# Patient Record
Sex: FEMALE | Race: WHITE | Employment: FULL TIME | URBAN - METROPOLITAN AREA
[De-identification: names, ages, dates, MRNs, and addresses within clinical notes are randomized per-mention and may not be internally consistent; named-entity substitution may affect disease eponyms.]

---

## 2020-02-21 ENCOUNTER — OFFICE VISIT (OUTPATIENT)
Dept: PRIMARY CARE CLINIC | Age: 25
End: 2020-02-21
Payer: COMMERCIAL

## 2020-02-21 VITALS
HEART RATE: 83 BPM | SYSTOLIC BLOOD PRESSURE: 115 MMHG | OXYGEN SATURATION: 97 % | BODY MASS INDEX: 28 KG/M2 | HEIGHT: 63 IN | DIASTOLIC BLOOD PRESSURE: 70 MMHG | WEIGHT: 158 LBS

## 2020-02-21 DIAGNOSIS — Z00.00 PHYSICAL EXAM: ICD-10-CM

## 2020-02-21 LAB
A/G RATIO: 1.7 (ref 1.1–2.2)
ALBUMIN SERPL-MCNC: 4.5 G/DL (ref 3.4–5)
ALP BLD-CCNC: 40 U/L (ref 40–129)
ALT SERPL-CCNC: 40 U/L (ref 10–40)
ANION GAP SERPL CALCULATED.3IONS-SCNC: 18 MMOL/L (ref 3–16)
AST SERPL-CCNC: 57 U/L (ref 15–37)
BASOPHILS ABSOLUTE: 0 K/UL (ref 0–0.2)
BASOPHILS RELATIVE PERCENT: 0.3 %
BILIRUB SERPL-MCNC: 0.4 MG/DL (ref 0–1)
BUN BLDV-MCNC: 9 MG/DL (ref 7–20)
CALCIUM SERPL-MCNC: 9.7 MG/DL (ref 8.3–10.6)
CHLORIDE BLD-SCNC: 101 MMOL/L (ref 99–110)
CHOLESTEROL, TOTAL: 203 MG/DL (ref 0–199)
CO2: 21 MMOL/L (ref 21–32)
CREAT SERPL-MCNC: 0.6 MG/DL (ref 0.6–1.1)
EOSINOPHILS ABSOLUTE: 0.1 K/UL (ref 0–0.6)
EOSINOPHILS RELATIVE PERCENT: 1.7 %
GFR AFRICAN AMERICAN: >60
GFR NON-AFRICAN AMERICAN: >60
GLOBULIN: 2.7 G/DL
GLUCOSE BLD-MCNC: 64 MG/DL (ref 70–99)
HCT VFR BLD CALC: 40.6 % (ref 36–48)
HDLC SERPL-MCNC: 50 MG/DL (ref 40–60)
HEMOGLOBIN: 13.7 G/DL (ref 12–16)
LDL CHOLESTEROL CALCULATED: 134 MG/DL
LYMPHOCYTES ABSOLUTE: 1.3 K/UL (ref 1–5.1)
LYMPHOCYTES RELATIVE PERCENT: 17.3 %
MCH RBC QN AUTO: 30.7 PG (ref 26–34)
MCHC RBC AUTO-ENTMCNC: 33.7 G/DL (ref 31–36)
MCV RBC AUTO: 91 FL (ref 80–100)
MONOCYTES ABSOLUTE: 0.4 K/UL (ref 0–1.3)
MONOCYTES RELATIVE PERCENT: 4.8 %
NEUTROPHILS ABSOLUTE: 5.6 K/UL (ref 1.7–7.7)
NEUTROPHILS RELATIVE PERCENT: 75.9 %
PDW BLD-RTO: 12.8 % (ref 12.4–15.4)
PLATELET # BLD: 267 K/UL (ref 135–450)
PMV BLD AUTO: 7.9 FL (ref 5–10.5)
POTASSIUM SERPL-SCNC: 4.8 MMOL/L (ref 3.5–5.1)
RBC # BLD: 4.47 M/UL (ref 4–5.2)
SODIUM BLD-SCNC: 140 MMOL/L (ref 136–145)
TOTAL PROTEIN: 7.2 G/DL (ref 6.4–8.2)
TRIGL SERPL-MCNC: 94 MG/DL (ref 0–150)
VLDLC SERPL CALC-MCNC: 19 MG/DL
WBC # BLD: 7.4 K/UL (ref 4–11)

## 2020-02-21 PROCEDURE — 99385 PREV VISIT NEW AGE 18-39: CPT | Performed by: FAMILY MEDICINE

## 2020-02-21 RX ORDER — LEVONORGESTREL AND ETHINYL ESTRADIOL 0.15-0.03
KIT ORAL
COMMUNITY
Start: 2020-02-05

## 2020-02-21 ASSESSMENT — ENCOUNTER SYMPTOMS
SINUS PRESSURE: 0
BLOOD IN STOOL: 0
COUGH: 0
CHEST TIGHTNESS: 0
BACK PAIN: 0
SINUS PAIN: 0
CONSTIPATION: 0
WHEEZING: 0
ABDOMINAL PAIN: 0
SORE THROAT: 0
VOMITING: 0
DIARRHEA: 0
SHORTNESS OF BREATH: 0
NAUSEA: 0
RHINORRHEA: 0

## 2020-02-21 ASSESSMENT — PATIENT HEALTH QUESTIONNAIRE - PHQ9
SUM OF ALL RESPONSES TO PHQ QUESTIONS 1-9: 0
SUM OF ALL RESPONSES TO PHQ9 QUESTIONS 1 & 2: 0
2. FEELING DOWN, DEPRESSED OR HOPELESS: 0
SUM OF ALL RESPONSES TO PHQ QUESTIONS 1-9: 0
1. LITTLE INTEREST OR PLEASURE IN DOING THINGS: 0

## 2020-04-06 ENCOUNTER — VIRTUAL VISIT (OUTPATIENT)
Dept: PSYCHOLOGY | Age: 25
End: 2020-04-06
Payer: COMMERCIAL

## 2020-04-06 ENCOUNTER — VIRTUAL VISIT (OUTPATIENT)
Dept: PRIMARY CARE CLINIC | Age: 25
End: 2020-04-06
Payer: COMMERCIAL

## 2020-04-06 ENCOUNTER — TELEPHONE (OUTPATIENT)
Dept: PRIMARY CARE CLINIC | Age: 25
End: 2020-04-06

## 2020-04-06 PROCEDURE — 90791 PSYCH DIAGNOSTIC EVALUATION: CPT | Performed by: PSYCHOLOGIST

## 2020-04-06 PROCEDURE — 99212 OFFICE O/P EST SF 10 MIN: CPT | Performed by: FAMILY MEDICINE

## 2020-04-06 ASSESSMENT — PATIENT HEALTH QUESTIONNAIRE - PHQ9
SUM OF ALL RESPONSES TO PHQ QUESTIONS 1-9: 0
SUM OF ALL RESPONSES TO PHQ9 QUESTIONS 1 & 2: 0
1. LITTLE INTEREST OR PLEASURE IN DOING THINGS: 0
SUM OF ALL RESPONSES TO PHQ QUESTIONS 1-9: 0
2. FEELING DOWN, DEPRESSED OR HOPELESS: 0

## 2020-04-06 ASSESSMENT — ANXIETY QUESTIONNAIRES
6. BECOMING EASILY ANNOYED OR IRRITABLE: 0-NOT AT ALL
1. FEELING NERVOUS, ANXIOUS, OR ON EDGE: 0-NOT AT ALL
3. WORRYING TOO MUCH ABOUT DIFFERENT THINGS: 0-NOT AT ALL
7. FEELING AFRAID AS IF SOMETHING AWFUL MIGHT HAPPEN: 0-NOT AT ALL
2. NOT BEING ABLE TO STOP OR CONTROL WORRYING: 0-NOT AT ALL
GAD7 TOTAL SCORE: 1
5. BEING SO RESTLESS THAT IT IS HARD TO SIT STILL: 1-SEVERAL DAYS
4. TROUBLE RELAXING: 0-NOT AT ALL

## 2020-04-06 NOTE — Clinical Note
Jessica, Let me know what you think about eval (see Assess section). I wasn't sure if you would want VV with pt to discuss or if you felt comfortable prescribing without. I let pt know you would need to decide this.   Crystal

## 2020-04-06 NOTE — PROGRESS NOTES
Behavioral Health Consultation  Gricel Tello PsyD  Psychologist  4/6/2020  9:08 AM      PLEASE NOTE THIS SHOULD HAVE BEEN DOCUMENTED AS TELEMEDICINE VISIT. NOTE - this visit conducted via audiovisual means : MyChart, Doxy, etc, in accordance with CMS guidelines. During AUFKE-50 public health emergency. Visit initiated at patient or caregiver request with permission to bill to insurer granted. Telemedicine APA guidelines were reviewed and discussed. Patient gave verbal consent for teleservices and will sign a consent form when feasible. Location of provider: [facility] care  Location of patient: home    Time spent with Patient: 35 minutes  This is patient's first  [facility] appointment. Reason for Consult:  ADD, predominate inattentive type  Referring Provider: Samantha Calvo MD  [address] [place] [address] [address] [address] [facility] Atilio López, Kendra [facility] 70    Pt provided informed consent for the behavioral health program. Discussed with patient model of service to include the limits of confidentiality (i.e. abuse reporting, suicide intervention, etc.) and short-term intervention focused approach. Pt indicated understanding. Feedback given to PCP. S:    Presenting Problem (PP): ADHD? Problem hx: Per Dr Juanito Graves note on 2/21: \"Would like a formal evaluation of ADHD. She does not want to establish with psychiatry just yet. Per patient she is having difficulty focusing and concentrating. \"    UPDATE: full time at work. Attention problems at work, biggest symptom is trouble listening with others, tend to drift off when others talk. If task not stimulating to her, she will \"push it off\". Most of her feedback is that \"friends tell me that I'm not a good listener\". Tends to fidget but doesn't need to move around physically. Work: feels attention is impacting performance at work, will miss pieces of instruction, have to ask 3-4 x before \"get it\".  Good news:  Not at risk for losing job    School hx: figured a way around it as a child and HS. Until college, struggled more, B average student. Major: engineering. Work in . College: [employer]. Graduate school at [address] as well: [employer] for engineers. Work: [employer], [address] [facility] for the last 3 years    Moved here to [place] 3 years ago. Home town: [place]    Stressor: no family stressors, no dating relationship issues    Dating relationship: none    Social support: a few friends been visiting     Trauma hx: none    Past psych tx: none    Current psych med tx: none     Psych ROS:      Depression: negative screen     Airam: DENIES insomnia with increased energy, rapid speech, easily distracted or decreased attention, irritability, racing thoughts, expansive mood, increase in energy and goal directed behavior, grandiosity, flight of ideas     Anxiety:  negative screen    OCD:  Denies     Panic:  Denies     Psychosis: denies A/VH, or delusions    Substance abuse: None     PTSD:  negative screen    O:  MSE:    Appearance    alert, cooperative  Appetite normal  Sleep disturbance No  Fatigue No  Loss of pleasure No  Impulsive behavior No  Speech    normal rate, normal volume and well articulated  Mood    Stable, managing  Affect    normal affect  Thought Content    intact  Thought Process    linear, goal directed and coherent  Associations    logical connections  Insight    Good  Judgment    Intact  Orientation    oriented to person, place, time, and general circumstances  Memory    recent and remote memory intact  Attention/Concentration    intact  Morbid ideation No  Suicide Assessment    no suicidal ideation      History:    Medications:   Current Outpatient Medications   Medication Sig Dispense Refill    LILLOW 0.15-30 MG-MCG per tablet        No current facility-administered medications for this visit.         Social History:   Social History     Socioeconomic History    Marital status: Single     Spouse name: Dad-Dylan Greer Severity: 1-4 = Minimal depression, 5-9 = Mild depression, 10-14 = Moderate depression, 15-19 = Moderately severe depression, 20-27 = Severe depression    NICKY 7 SCORE 4/6/2020   NICKY-7 Total Score 1     Interpretation of NICKY-7 score: 5-9 = mild anxiety, 10-14 = moderate anxiety, 15+ = severe anxiety. Recommend referral to behavioral health for scores 10 or greater. No hx psych admission. No hx suicide attempts/gestures. No hx substance abuse tx. Family MH hx: none. Denied any si/hi risk. Diagnosis:    ADD, predominate inattentive type  No past medical history on file. Problems related to the social environment and Occupational problems    Plan:  Pt interventions:    Provided education on the use of medication to treat  attention issues. , Practiced assertive communication, Discussed self-care (sleep, nutrition, rewarding activities, social support, exercise), Established rapport, Conducted functional assessment and Supportive techniques    Pt Behavioral Change Plan:    1. Read ADHD handout: discussed benefits of physical exercise and impact on ADD brain  2. Consult with Dr Jose Benavidez about ADD medication trial options  3.  No further follow up required with Dr Cassandra Pereira, return as needed

## 2020-04-07 ASSESSMENT — ENCOUNTER SYMPTOMS
COUGH: 0
DIARRHEA: 0
ABDOMINAL PAIN: 0
SHORTNESS OF BREATH: 0
CONSTIPATION: 0
EYE PAIN: 0

## 2020-05-05 ENCOUNTER — VIRTUAL VISIT (OUTPATIENT)
Dept: PRIMARY CARE CLINIC | Age: 25
End: 2020-05-05
Payer: COMMERCIAL

## 2020-05-05 PROCEDURE — 99212 OFFICE O/P EST SF 10 MIN: CPT | Performed by: FAMILY MEDICINE

## 2020-05-05 ASSESSMENT — ENCOUNTER SYMPTOMS
DIARRHEA: 0
CONSTIPATION: 0
EYE PAIN: 0
COUGH: 0
SHORTNESS OF BREATH: 0
ABDOMINAL PAIN: 0

## 2020-05-05 NOTE — PROGRESS NOTES
Socially    Drug use: Never            PHYSICAL EXAMINATION:  [ INSTRUCTIONS:  \"[x]\" Indicates a positive item  \"[]\" Indicates a negative item  -- DELETE ALL ITEMS NOT EXAMINED]  [x] Alert  [] Oriented to person/place/time    [x] No apparent distress  [] Toxic appearing    [] Face flushed appearing [x] Sclera clear  [] Lips are cyanotic      [x] Breathing appears normal  [] Appears tachypneic      [] Rash on visible skin    [x] Cranial Nerves II-XII grossly intact    [x] Motor grossly intact in visible upper extremities    [x] Motor grossly intact in visible lower extremities    [x] Normal Mood  [] Anxious appearing    [] Depressed appearing  [] Confused appearing        Due to this being a TeleHealth encounter, evaluation of the following organ systems is limited: Vitals/Constitutional/EENT/Resp/CV/GI//MS/Neuro/Skin/Heme-Lymph-Imm. ASSESSMENT/PLAN:  1. ADHD (attention deficit hyperactivity disorder), inattentive type  Would like to try 30 mg daily to see how she feels. Will refill 10 mg tablets, taking three tabs daily. Will let me know if she wants to go back down to 20 or stay at 30 mg. Follow up in 3 months, sooner if needed. - Lisdexamfetamine Dimesylate 10 MG CAPS; Take 30 mg by mouth daily for 30 days. Dispense: 90 capsule; Refill: 0      No follow-ups on file. No future appointments. An  electronic signature was used to authenticate this note. --Roderick Ramos MD on 5/5/2020 at 1:39 PM    {Coding Help -- Use CPT 28723-16875 with EM elements or Time rules for Office Visits. :    5 to 10 minutes were spent on the digital evaluation and management of this patient.     Pursuant to the emergency declaration under the [facility], [facility] waiver authority and the [employer] Act, this Virtual  Visit was conducted, with patient's consent, to reduce the patient's risk of exposure to COVID-19 and provide continuity of care for an established patient. Services were provided through a video synchronous discussion virtually to substitute for in-person clinic visit.

## 2020-06-09 ENCOUNTER — PATIENT MESSAGE (OUTPATIENT)
Dept: PRIMARY CARE CLINIC | Age: 25
End: 2020-06-09

## 2020-06-27 ENCOUNTER — OFFICE VISIT (OUTPATIENT)
Dept: PRIMARY CARE CLINIC | Age: 25
End: 2020-06-27

## 2020-06-27 NOTE — PROGRESS NOTES
Marlee Rasmussen received a viral test for COVID-19. They were educated on isolation and quarantine as appropriate. For any symptoms, they were directed to seek care from their PCP, given contact information to establish with a doctor, directed to an urgent care or the emergency room.

## 2020-06-30 LAB
SARS-COV-2: NOT DETECTED
SOURCE: NORMAL

## 2020-07-10 ENCOUNTER — PATIENT MESSAGE (OUTPATIENT)
Dept: PRIMARY CARE CLINIC | Age: 25
End: 2020-07-10

## 2020-07-10 NOTE — TELEPHONE ENCOUNTER
From: Lan Reyes  To: Jose Pereira MD  Sent: 7/10/2020 11:53 AM EDT  Subject: Prescription Question    Hi Dr. Adalid Dumont,    I am low again on my 30mg Vyvanse. Let me know if a follow up call is required. Thanks!   Miah Covington

## 2020-07-10 NOTE — TELEPHONE ENCOUNTER
[address]  LOV 05/05/20  Return in about 3 months   No F/U       PDMP  Last reviewed by Nathaniel White MD on 6/9/2020 at 2:01 PM

## 2020-08-11 ENCOUNTER — VIRTUAL VISIT (OUTPATIENT)
Dept: PRIMARY CARE CLINIC | Age: 25
End: 2020-08-11
Payer: COMMERCIAL

## 2020-08-11 PROCEDURE — 99213 OFFICE O/P EST LOW 20 MIN: CPT | Performed by: STUDENT IN AN ORGANIZED HEALTH CARE EDUCATION/TRAINING PROGRAM

## 2020-08-11 ASSESSMENT — ENCOUNTER SYMPTOMS
COUGH: 0
SHORTNESS OF BREATH: 0
DIARRHEA: 0
CONSTIPATION: 0
EYE PAIN: 0
ABDOMINAL PAIN: 0
BLOOD IN STOOL: 0

## 2020-08-11 NOTE — PATIENT INSTRUCTIONS
exposed skin. · See a dentist one or two times a year for checkups and to have your teeth cleaned. · Wear a seat belt in the car. Follow your doctor's advice about when to have certain tests. These tests can spot problems early. For everyone  · Cholesterol. Have the fat (cholesterol) in your blood tested after age 21. Your doctor will tell you how often to have this done based on your age, family history, or other things that can increase your risk for heart disease. · Blood pressure. Have your blood pressure checked during a routine doctor visit. Your doctor will tell you how often to check your blood pressure based on your age, your blood pressure results, and other factors. · Vision. Talk with your doctor about how often to have a glaucoma test.  · Diabetes. Ask your doctor whether you should have tests for diabetes. · Colon cancer. Your risk for colorectal cancer gets higher as you get older. Some experts say that adults should start regular screening at age 48 and stop at age 76. Others say to start before age 48 or continue after age 76. Talk with your doctor about your risk and when to start and stop screening. For women  · Breast exam and mammogram. Talk to your doctor about when you should a mammogram. Medical experts differ on whether and how often women under 50 should have these tests. Your doctor can help you decide what is right for you. · Cervical cancer screening test and pelvic exam. Begin with a Pap test at age 24. The test often is part of a pelvic exam. Starting at age 27, you may choose to have a Pap test, an HPV test, or both tests at the same time (called co-testing). Talk with your doctor about how often to have testing. · Tests for sexually transmitted infections (STIs). Ask whether you should have tests for STIs. You may be at risk if you have sex with more than one person, especially if your partners do not wear condoms.   For men  · Tests for sexually transmitted infections (STIs). Ask whether you should have tests for STIs. You may be at risk if you have sex with more than one person, especially if you do not wear a condom. · Testicular cancer exam. Ask your doctor whether you should check your testicles regularly. · Prostate exam. Talk to your doctor about whether you should have a blood test (called a PSA test) for prostate cancer. Experts differ on whether and when men should have this test. Some experts suggest it if you are older than 39 and are -American or have a father or brother who got prostate cancer when he was younger than 72. When should you call for help? Watch closely for changes in your health, and be sure to contact your doctor if you have any problems or symptoms that concern you. Where can you learn more? Go to [email or web address] Biosci[email or web address] [email or web address] org and sign in to your [employer] account. Enter P072 in the Exo box to learn more about \"Well Visit, Ages 25 to 48: Care Instructions. \"     If you do not have an account, please click on the \"Sign Up Now\" link. Current as of: August 22, 2019               Content Version: 12.5  © 8850-1919 Healthwise, [employer]. Care instructions adapted under license by [facility] ([facility]). If you have questions about a medical condition or this instruction, always ask your healthcare professional. [place] [address] any warranty or liability for your use of this information. Patient Education        Attention Deficit Hyperactivity Disorder (ADHD) in Adults: Care Instructions  Your Care Instructions     Attention deficit hyperactivity disorder, or ADHD, is a condition that makes it hard to pay attention. So you may have problems when you try to focus, get organized, and finish tasks. It might make you more active than other people. Or you might do things without thinking first.  ADHD is very common. It usually starts in early childhood.  Many adults don't realize they have it until their children are diagnosed. Then they become aware of their own symptoms. Doctors don't know what causes ADHD. But it often runs in families. ADHD can be treated with medicines, behavior training, and counseling. Treatment can improve your life. Follow-up care is a key part of your treatment and safety. Be sure to make and go to all appointments, and call your doctor if you are having problems. It's also a good idea to know your test results and keep a list of the medicines you take. How can you care for yourself at home? · Learn all you can about ADHD. This will help you and your family understand it better. · Take your medicines exactly as prescribed. Call your doctor if you think you are having a problem with your medicine. You will get more details on the specific medicines your doctor prescribes. · If you miss a dose of your medicine, do not take an extra dose. · If your doctor suggests counseling, find a counselor you like and trust. Talk openly and honestly. Be willing to make some changes. · Find a support group for adults with ADHD. Talking to others with the same problems can help you feel better. It can also give you ideas about how to best cope with the condition. · Get rid of distractions at your work space. Keep your desk clean. Try not to face a window or busy hallway. · Use files, planners, and other tools to keep you organized. · Limit use of alcohol, and do not use illegal drugs. People with ADHD tend to develop substance use disorder more easily than others. Tell your doctor if you need help to quit. Counseling, support groups, and sometimes medicines can help you stay free of alcohol or drugs. · Get at least 30 minutes of physical activity on most days of the week. Exercise has been shown to help people cope with ADHD. Walking is a good choice. You also may want to do other activities, such as running, swimming, cycling, or playing tennis or team sports.   When should you call for help?  Watch closely for changes in your health, and be sure to contact your doctor if:  · You feel sad a lot or cry all the time. · You have trouble sleeping, or you sleep too much. · You find it hard to concentrate, make decisions, or remember things. · You change how you normally eat. · You feel guilty for no reason. Where can you learn more? Go to https://SplitpeeShop Ventures.Isoflux. org and sign in to your [employer] account. Enter B196 in the Snaptalent box to learn more about \"Attention Deficit Hyperactivity Disorder (ADHD) in Adults: Care Instructions. \"     If you do not have an account, please click on the \"Sign Up Now\" link. Current as of: January 31, 2020               Content Version: 12.5  © 9314-5646 Healthwise, Incorporated. Care instructions adapted under license by [facility] ([facility]). If you have questions about a medical condition or this instruction, always ask your healthcare professional. [address] any warranty or liability for your use of this information.

## 2020-08-11 NOTE — PROGRESS NOTES
2020    Vineet Chanel (:  1995) is a 30 y.o. female, here for evaluation of the following medical concerns:    HPI    Sexual activity: {Persons; sexual partners:705}   Last eye exam: ***, {NORMAL/ABNORMAL:292608688::\"normal\"}  Last Dental exam: ***, {NORMAL/ABNORMAL:799698163::\"normal\"}  Diet: {BZUU:25324}  Exercise: {EXERCISE RRZI:168638945}  Seatbelt use: {YES/NO:}  Helmet use: {YES/NO:}  Sunscreen: {YES/NO:}    Review of Systems   Constitutional: Negative for activity change, fatigue and unexpected weight change. HENT: Negative for hearing loss. Eyes: Negative for visual disturbance. Respiratory: Negative for cough and shortness of breath. Cardiovascular: Negative for chest pain and leg swelling. Gastrointestinal: Negative for blood in stool. Endocrine: Negative for polydipsia and polyphagia. Genitourinary: Negative for dysuria and frequency. Musculoskeletal: Negative for arthralgias. Skin: Negative for rash. Allergic/Immunologic: Negative for environmental allergies. Neurological: Negative for dizziness and headaches. Hematological: Does not bruise/bleed easily. Psychiatric/Behavioral: Negative for dysphoric mood and sleep disturbance. The patient is not nervous/anxious. Prior to Visit Medications    Medication Sig Taking? Authorizing Provider   Lisdexamfetamine Dimesylate 10 MG CAPS Take 30 mg by mouth daily for 30 days. MD JOANNE Yanez 0.15-30 MG-MCG per tablet   Historical Provider, MD        Allergies   Allergen Reactions    Amoxicillin Hives     Unknown patient states she has been tested for that when she was younger         No past medical history on file. No past surgical history on file.     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[employer]   Social Needs    Financial resource strain: Not on file    Food insecurity     Worry: Not on file     Inability: Not on file   [employer] needs     Medical: Not on file     Non-medical: Not on file   Tobacco Use    Smoking status: Never Smoker    Smokeless tobacco: Never Used   Substance and Sexual Activity    Alcohol use: Yes     Comment: Socially    Drug use: Never    Sexual activity: Not Currently     Partners: Male     Birth control/protection: Pill   Lifestyle    Physical activity     Days per week: Not on file     Minutes per session: Not on file    Stress: Not on file   Relationships    Social connections     Talks on phone: Not on file     Gets together: Not on file     Attends Synagogue service: Not on file     Active member of club or organization: Not on file     Attends meetings of clubs or organizations: Not on file     Relationship status: Not on file    Intimate partner violence     Fear of current or ex partner: Not on file     Emotionally abused: Not on file     Physically abused: Not on file     Forced sexual activity: Not on file   Other Topics Concern    Not on file   Social History Narrative    Not on file        Family History   Problem Relation Age of Onset    Hypertension Mother     No Known Problems Father     No Known Problems Brother     No Known Problems Brother        There were no vitals filed for this visit. Estimated body mass index is 28.21 kg/m² as calculated from the following:    Height as of 2/21/20: 5' 2.75\" (1.594 m). Weight as of 2/21/20: 158 lb (71.7 kg). Physical Exam  Constitutional:       Appearance: Normal appearance. HENT:      Head: Normocephalic and atraumatic. Nose: Nose normal.   Eyes:      Extraocular Movements: Extraocular movements intact. Conjunctiva/sclera: Conjunctivae normal.   Neck:      Musculoskeletal: Normal range of motion and neck supple.    Cardiovascular:      Rate and Rhythm: Normal rate and regular rhythm. Pulses: Normal pulses. Pulmonary:      Effort: Pulmonary effort is normal.      Breath sounds: Normal breath sounds. Abdominal:      General: Abdomen is flat. Bowel sounds are normal.      Palpations: Abdomen is soft. Musculoskeletal: Normal range of motion. Skin:     General: Skin is warm and dry. Capillary Refill: Capillary refill takes less than 2 seconds. Findings: No rash. Neurological:      General: No focal deficit present. Mental Status: She is alert and oriented to person, place, and time. Mental status is at baseline. Psychiatric:         Mood and Affect: Mood normal.         Separate Identifiable issues addressed today:  {Visit Chronic CHP (Optional):01785}    ASSESSMENT/PLAN:  Diagnoses and all orders for this visit:    Encounter for medical examination to establish care        No follow-ups on file. An  electronic signature was used to authenticate this note.     --Chase Daley, DO on 8/11/2020 at 7:17 AM

## 2020-08-11 NOTE — PROGRESS NOTES
2020    TELEHEALTH EVALUATION -- Audio/Visual (During QZLWR-73 public health emergency)    HPI:    Alissa Lockett (:  1995) has requested an audio/video evaluation for the following concern(s):    ADD/ADHD:  Current treatment: Vyvanse- 30 mg, which has been effective. Residual symptoms: none. Medication side effects: None. Patient denies weight loss, nervousness, anxiousness, decreased appetite. Review of Systems   Constitutional: Negative for appetite change, chills, fatigue and fever. HENT: Negative for congestion. Eyes: Negative for pain and visual disturbance. Respiratory: Negative for cough and shortness of breath. Cardiovascular: Negative for chest pain and palpitations. Gastrointestinal: Negative for abdominal pain, constipation and diarrhea. Genitourinary: Negative for difficulty urinating. Musculoskeletal: Negative for arthralgias. Skin: Negative for rash and wound. Neurological: Negative for dizziness, weakness, light-headedness and headaches. Hematological: Does not bruise/bleed easily. Psychiatric/Behavioral: Negative for behavioral problems. Prior to Visit Medications    Medication Sig Taking? Authorizing Provider   Lisdexamfetamine Dimesylate 10 MG CAPS Take 30 mg by mouth daily for 30 days. Yes Shruti Lynne DO   LILLOW 0.15-30 MG-MCG per tablet   Historical Provider, MD       Social History     Tobacco Use    Smoking status: Never Smoker    Smokeless tobacco: Never Used   Substance Use Topics    Alcohol use: Yes     Comment: Socially    Drug use: Never        Family History   Problem Relation Age of Onset    Hypertension Mother     No Known Problems Father     No Known Problems Brother     No Known Problems Brother      Physical Exam  Constitutional:       Appearance: Normal appearance. HENT:      Head: Normocephalic and atraumatic. Nose: Nose normal.   Eyes:      Extraocular Movements: Extraocular movements intact.       Conjunctiva/sclera: Conjunctivae normal.   Pulmonary:      Effort: Pulmonary effort is normal.   Neurological:      Mental Status: She is alert. Psychiatric:         Mood and Affect: Mood normal.       ASSESSMENT/PLAN:  1. ADHD (attention deficit hyperactivity disorder), inattentive type: Doing well with focus. No SEs. Routine follow up in 3 month. - Lisdexamfetamine Dimesylate 10 MG CAPS; Take 30 mg by mouth daily for 30 days. Dispense: 90 capsule; Refill: 0      Return in about 3 months (around 11/11/2020). Rosy Molina is a 30 y.o. female being evaluated by a Virtual Visit (video visit) encounter to address concerns as mentioned above. A caregiver was present when appropriate. Due to this being a TeleHealth encounter (During [facility]-41 public health emergency), evaluation of the following organ systems was limited: Vitals/Constitutional/EENT/Resp/CV/GI//MS/Neuro/Skin/Heme-Lymph-Imm. Pursuant to the emergency declaration under the [address] [place] [address], [address] [place] [address] and the [employer] and [employer] Act, this Virtual Visit was conducted with patient's (and/or legal guardian's) consent, to reduce the patient's risk of exposure to COVID-19 and provide necessary medical care. The patient (and/or legal guardian) has also been advised to contact this office for worsening conditions or problems, and seek emergency medical treatment and/or call 911 if deemed necessary. Patient identification was verified at the start of the visit: Yes    Total time spent on this encounter: ~ 10 min    Services were provided through a video synchronous discussion virtually to substitute for in-person clinic visit. Patient and provider were located at their individual homes. --Rene Disla DO on 8/11/2020 at 12:02 PM    An electronic signature was used to authenticate this note.

## 2020-09-10 ENCOUNTER — PATIENT MESSAGE (OUTPATIENT)
Dept: PRIMARY CARE CLINIC | Age: 25
End: 2020-09-10

## 2020-09-10 NOTE — TELEPHONE ENCOUNTER
Tera NEW 08/11/20   Return in about 3 months   No F/U       PDMP  Last reviewed by Nicole Reagan DO on 8/11/2020 at 12:04 PM

## 2020-10-12 ENCOUNTER — PATIENT MESSAGE (OUTPATIENT)
Dept: PRIMARY CARE CLINIC | Age: 25
End: 2020-10-12

## 2020-10-13 ENCOUNTER — TELEPHONE (OUTPATIENT)
Dept: PRIMARY CARE CLINIC | Age: 25
End: 2020-10-13

## 2020-10-13 NOTE — TELEPHONE ENCOUNTER
Received fax from pharmacy stating Vyvanse 10mg is not covered under insurance. Called pharm and they informed me that It exceeds her limit for a 30 day supply. This would need a PA or can change the script to 30 mg 1X daily. What would you like to do?

## 2020-12-09 ENCOUNTER — PATIENT MESSAGE (OUTPATIENT)
Dept: PRIMARY CARE CLINIC | Age: 25
End: 2020-12-09

## 2020-12-09 NOTE — TELEPHONE ENCOUNTER
From: Jensen Guerra  To: Angelo Gaviria MD  Sent: 12/9/2020 12:14 PM EST  Subject: Prescription Question    Hi Dr. Alysa Calixto,    I have a few days left on my Vyvanse.

## 2021-01-11 ENCOUNTER — PATIENT MESSAGE (OUTPATIENT)
Dept: PRIMARY CARE CLINIC | Age: 26
End: 2021-01-11

## 2021-01-11 DIAGNOSIS — F90.0 ADHD (ATTENTION DEFICIT HYPERACTIVITY DISORDER), INATTENTIVE TYPE: ICD-10-CM

## 2021-01-11 NOTE — TELEPHONE ENCOUNTER
From: Autumn Gillespie  To: Rama Florentino MD  Sent: 1/11/2021 9:25 AM EST  Subject: Prescription Question    Hi Dr. Jonathan Hernadez,    I have a few days left on my Vyvanse. Let me know if we need to meet.     Thanks,  Barbra Mcmahan

## 2021-01-13 ENCOUNTER — TELEPHONE (OUTPATIENT)
Dept: PRIMARY CARE CLINIC | Age: 26
End: 2021-01-13

## 2021-01-13 DIAGNOSIS — F90.0 ADHD (ATTENTION DEFICIT HYPERACTIVITY DISORDER), INATTENTIVE TYPE: ICD-10-CM

## 2021-01-13 NOTE — TELEPHONE ENCOUNTER
Pt requesting lisdexamfetamine (VYVANSE) 30 MG capsule [[id number]] be sent to new pharmacy. Old pharmacy is switching systems so they are behind. New pharmacy in chart.

## 2021-02-10 ENCOUNTER — PATIENT MESSAGE (OUTPATIENT)
Dept: PRIMARY CARE CLINIC | Age: 26
End: 2021-02-10

## 2021-02-10 DIAGNOSIS — F90.0 ADHD (ATTENTION DEFICIT HYPERACTIVITY DISORDER), INATTENTIVE TYPE: ICD-10-CM

## 2021-02-11 NOTE — TELEPHONE ENCOUNTER
From: Priya Walker  To: Linus Diaz MD  Sent: 2/10/2021 1:27 PM EST  Subject: Adams Henry,    I have a few days left on my Vyvanse.     Thanks,  Alexy Lama

## 2021-02-13 NOTE — PROGRESS NOTES
2/15/2021    TELEHEALTH EVALUATION -- Audio/Visual (During WUAOX-01 public health emergency)    HPI:    Shobha Reagan (:  1995) has requested an audio/video evaluation for the following concern(s):    Current Meds:  Vyvanse 30 mg daily   -Takes 7 days/week  Sleeping: No problems reported  Eating: No reported changes in appetite. Weight Loss/Gain: No concerns reported. Side effects to the medication: Denied CP, Palpitations, SOB, Changes in bowel habits. Work has been going great. Are there any new stressors affecting Karen?: No, she is feeling that Vyvanse 30 is not making the cut most days. It is great in the day time but after 2pm or so, it wears off, especially after lunch, and she still needs 3-4 more hours of activity/stim. If there is counseling or mental health involvement, is Miladys Prince attending sessions? No    Review of Systems   Constitutional: Negative for appetite change, chills, fatigue and fever. HENT: Negative for congestion. Eyes: Negative for pain and visual disturbance. Respiratory: Negative for cough and shortness of breath. Cardiovascular: Negative for chest pain and palpitations. Gastrointestinal: Negative for abdominal pain, constipation and diarrhea. Genitourinary: Negative for difficulty urinating. Musculoskeletal: Negative for arthralgias. Skin: Negative for rash and wound. Neurological: Negative for dizziness, weakness, light-headedness and headaches. Hematological: Does not bruise/bleed easily. Psychiatric/Behavioral: Negative for behavioral problems. Prior to Visit Medications    Medication Sig Taking? Authorizing Provider   amphetamine-dextroamphetamine (ADDERALL, 5MG,) 5 MG tablet Take 1 tablet by mouth daily as needed (decreased focus/concentration) for up to 30 days. Yes Claudio Farias MD   lisdexamfetamine (VYVANSE) 30 MG capsule Take 1 capsule by mouth every morning for 30 days.  Yes Claudio Farias MD [facility] 0.15-30 MG-MCG per tablet   Historical Provider, MD       Social History     Tobacco Use    Smoking status: Never Smoker    Smokeless tobacco: Never Used   Substance Use Topics    Alcohol use: Yes     Comment: Socially    Drug use: Never        Allergies   Allergen Reactions    Amoxicillin Hives     Unknown patient states she has been tested for that when she was younger     , History reviewed. No pertinent past medical history. , History reviewed. No pertinent surgical history. ,   Social History     Tobacco Use    Smoking status: Never Smoker    Smokeless tobacco: Never Used   Substance Use Topics    Alcohol use: Yes     Comment: Socially    Drug use: Never   ,   Family History   Problem Relation Age of Onset    Hypertension Mother     No Known Problems Father     No Known Problems Brother     No Known Problems Brother    ,   Immunization History   Administered Date(s) Administered    DTP 1993, 10/28/1998    DTP/HiB 1993, 02/04/1994    DTaP (Infanrix) 04/24/1995    HPV Quadrivalent (Gardasil) 01/04/2006, 10/31/2006, 08/22/2007    Hepatitis A Ped/Adol (Havrix, Vaqta) 08/22/2007, 06/12/2009    Hepatitis B 1993, 02/04/1994, 07/08/1994    Hib vaccine 1993, 02/04/1994, 04/08/1994, 10/17/1994    MMR 02/13/1995, 10/28/1998    Meningococcal MCV4P (Menactra) 08/22/2007, 08/09/2012    Polio OPV 1993, 02/04/1994, 04/24/1995, 10/28/1998    Tdap (Boostrix, Adacel) 10/31/2006    Varicella (Varivax) 11/30/1995, 10/31/2006   ,   Health Maintenance   Topic Date Due    Hepatitis C screen  1995    HIV screen  09/27/2008    Cervical cancer screen  09/27/2014    DTaP/Tdap/Td vaccine (6 - Td) 10/31/2016    Flu vaccine (1) 09/01/2020    Hepatitis A vaccine  Completed    Hepatitis B vaccine  Completed    Hib vaccine  Completed    Varicella vaccine  Completed    Meningococcal (ACWY) vaccine  Completed    Pneumococcal 0-64 years Vaccine  Aged Out PHYSICAL EXAMINATION:  [ INSTRUCTIONS:  \"[x]\" Indicates a positive item  \"[]\" Indicates a negative item  -- DELETE ALL ITEMS NOT EXAMINED]  [x] Alert  [x] Oriented to person/place/time    [x] No apparent distress  [] Toxic appearing    [] Face flushed appearing [x] Sclera clear  [] Lips are cyanotic      [x] Breathing appears normal  [] Appears tachypneic      [] Rash on visible skin    [x] Cranial Nerves II-XII grossly intact    [] Motor grossly intact in visible upper extremities    [] Motor grossly intact in visible lower extremities    [x] Normal Mood  [] Anxious appearing    [] Depressed appearing  [] Confused appearing      Due to this being a TeleHealth encounter, evaluation of the following organ systems is limited: Vitals/Constitutional/EENT/Resp/CV/GI//MS/Neuro/Skin/Heme-Lymph-Imm. ASSESSMENT/PLAN:  1. ADHD (attention deficit hyperactivity disorder), inattentive type  Will continue Vyvanse 30 mg daily. On days that she needs more stim for on days she works longer, will incorp a Adderall 5 mg daily PRN. If this does not work, will try Vyvanse 40 mg. Follow up in 2 weeks via [facility] and 3 months for controlled sub/ADHD f/u  - amphetamine-dextroamphetamine (ADDERALL, 5MG,) 5 MG tablet; Take 1 tablet by mouth daily as needed (decreased focus/concentration) for up to 30 days. Dispense: 30 tablet; Refill: 0  - lisdexamfetamine (VYVANSE) 30 MG capsule; Take 1 capsule by mouth every morning for 30 days. Dispense: 30 capsule; Refill: 0      No follow-ups on file. No future appointments. An  electronic signature was used to authenticate this note. --Emilie Howell MD on 2/15/2021 at 10:37 AM    {Coding Help -- Use CPT 38098-63175 with EM elements or Time rules for Office Visits. :    11 to 15 minutes were spent on the digital evaluation and management of this patient. Pursuant to the emergency declaration under the [facility] waiver authority and the [employer] Act, this Virtual  Visit was conducted, with patient's consent, to reduce the patient's risk of exposure to COVID-19 and provide continuity of care for an established patient. Services were provided through a video synchronous discussion virtually to substitute for in-person clinic visit.

## 2021-02-15 ENCOUNTER — VIRTUAL VISIT (OUTPATIENT)
Dept: PRIMARY CARE CLINIC | Age: 26
End: 2021-02-15
Payer: COMMERCIAL

## 2021-02-15 DIAGNOSIS — F90.0 ADHD (ATTENTION DEFICIT HYPERACTIVITY DISORDER), INATTENTIVE TYPE: Primary | ICD-10-CM

## 2021-02-15 PROCEDURE — 99214 OFFICE O/P EST MOD 30 MIN: CPT | Performed by: FAMILY MEDICINE

## 2021-02-15 RX ORDER — DEXTROAMPHETAMINE SACCHARATE, AMPHETAMINE ASPARTATE, DEXTROAMPHETAMINE SULFATE AND AMPHETAMINE SULFATE 1.25; 1.25; 1.25; 1.25 MG/1; MG/1; MG/1; MG/1
5 TABLET ORAL DAILY PRN
Qty: 30 TABLET | Refills: 0 | Status: SHIPPED | OUTPATIENT
Start: 2021-02-15 | End: 2021-03-11 | Stop reason: SDUPTHER

## 2021-02-15 ASSESSMENT — ENCOUNTER SYMPTOMS
ABDOMINAL PAIN: 0
COUGH: 0
DIARRHEA: 0
SHORTNESS OF BREATH: 0
EYE PAIN: 0
CONSTIPATION: 0

## 2021-03-11 DIAGNOSIS — F90.0 ADHD (ATTENTION DEFICIT HYPERACTIVITY DISORDER), INATTENTIVE TYPE: ICD-10-CM

## 2021-03-11 RX ORDER — DEXTROAMPHETAMINE SACCHARATE, AMPHETAMINE ASPARTATE, DEXTROAMPHETAMINE SULFATE AND AMPHETAMINE SULFATE 1.25; 1.25; 1.25; 1.25 MG/1; MG/1; MG/1; MG/1
5 TABLET ORAL DAILY PRN
Qty: 30 TABLET | Refills: 0 | Status: SHIPPED | OUTPATIENT
Start: 2021-03-13 | End: 2021-04-26 | Stop reason: SDUPTHER

## 2021-03-29 NOTE — PROGRESS NOTES
Chief Complaint   Patient presents with    Dizziness     blurred vision, started  worst when driving. random. worse when standing. HPI:  Rachael Márquez is a 30 y.o. (: 1995) here today for dizziness that started Thursday. She felt this way before when she was iron deficient. She started taking iron when the dizziness started but it has not helped yet. No other associated symptoms like vision changes, chest pain, palpitations or shortness of breath. No new vitamins or supplements or medications. Takes Vyvanse and stopped taking this in case it was the cause. No change with or without. Review of Systems   Constitutional: Negative for activity change, appetite change, chills, fatigue, fever and unexpected weight change. HENT: Negative for congestion, postnasal drip, rhinorrhea, sinus pressure, sinus pain, sneezing and sore throat. Eyes: Negative for visual disturbance. Respiratory: Negative for cough, chest tightness, shortness of breath and wheezing. Cardiovascular: Negative for chest pain and palpitations. Gastrointestinal: Negative for abdominal pain, blood in stool, constipation, diarrhea, nausea and vomiting. Endocrine: Negative for cold intolerance, heat intolerance, polydipsia and polyuria. Genitourinary: Negative for dysuria, frequency, vaginal bleeding and vaginal discharge. Musculoskeletal: Negative for arthralgias, back pain, joint swelling, myalgias and neck pain. Skin: Negative for rash and wound. Allergic/Immunologic: Negative for environmental allergies. Neurological: Positive for dizziness. Negative for tremors, syncope, weakness, light-headedness, numbness and headaches. Hematological: Negative for adenopathy. Psychiatric/Behavioral: Negative for behavioral problems, decreased concentration, sleep disturbance and suicidal ideas. The patient is not nervous/anxious. No past medical history on file.     Family History   Problem Relation Age of Onset    Hypertension Mother     No Known Problems Father     No Known Problems Brother     No Known Problems Brother        Social History     Tobacco Use    Smoking status: Never Smoker    Smokeless tobacco: Never Used   Substance Use Topics    Alcohol use: Yes     Comment: Socially    Drug use: Never       New Prescriptions    No medications on file       Meds Prior to visit:  Current Outpatient Medications on File Prior to Visit   Medication Sig Dispense Refill    lisdexamfetamine (VYVANSE) 30 MG capsule Take 1 capsule by mouth every morning for 30 days. 30 capsule 0    amphetamine-dextroamphetamine (ADDERALL, 5MG,) 5 MG tablet Take 1 tablet by mouth daily as needed (decreased focus/concentration) for up to 30 days. 30 tablet 0    LILLOW 0.15-30 MG-MCG per tablet        No current facility-administered medications on file prior to visit. Allergies   Allergen Reactions    Amoxicillin Hives     Unknown patient states she has been tested for that when she was younger         OBJECTIVE:  /79   Pulse 73   Temp 97.3 °F (36.3 °C) (Temporal)   Resp 16   Ht 5' 3\" (1.6 m)   Wt 147 lb 6.4 oz (66.9 kg)   LMP 03/13/2021   Breastfeeding No   BMI 26.11 kg/m²   BP Readings from Last 2 Encounters:   03/30/21 125/79   02/21/20 115/70     Wt Readings from Last 3 Encounters:   03/30/21 147 lb 6.4 oz (66.9 kg)   02/21/20 158 lb (71.7 kg)       Physical Exam  Vitals signs reviewed. Constitutional:       General: She is not in acute distress. Appearance: She is well-developed and normal weight. HENT:      Head: Normocephalic and atraumatic. Right Ear: Tympanic membrane, ear canal and external ear normal. There is no impacted cerumen. Left Ear: Tympanic membrane, ear canal and external ear normal. There is no impacted cerumen. Mouth/Throat:      Mouth: Mucous membranes are moist.      Pharynx: Oropharynx is clear. No oropharyngeal exudate or posterior oropharyngeal erythema.    Eyes: General: No scleral icterus. Right eye: No discharge. Left eye: No discharge. Conjunctiva/sclera: Conjunctivae normal.      Pupils: Pupils are equal, round, and reactive to light. Neck:      Musculoskeletal: Normal range of motion and neck supple. Cardiovascular:      Rate and Rhythm: Normal rate and regular rhythm. Heart sounds: Normal heart sounds. No murmur. Comments: Radial and pedal pulses intact  Pulmonary:      Effort: Pulmonary effort is normal. No respiratory distress. Breath sounds: Normal breath sounds. No wheezing or rales. Chest:      Chest wall: No tenderness. Abdominal:      General: Bowel sounds are normal.      Palpations: Abdomen is soft. Tenderness: There is no abdominal tenderness. There is no guarding. Comments: Normal liver and spleen. No organomegaly   Musculoskeletal: Normal range of motion. General: No tenderness. Comments: Intact in all extremities   Lymphadenopathy:      Cervical: No cervical adenopathy. Skin:     General: Skin is warm. Findings: No erythema or rash. Neurological:      General: No focal deficit present. Mental Status: She is alert and oriented to person, place, and time. Mental status is at baseline. Motor: No weakness or abnormal muscle tone. Coordination: Coordination normal.      Gait: Gait normal.      Deep Tendon Reflexes: Reflexes normal.   Psychiatric:         Mood and Affect: Mood normal.         Behavior: Behavior normal.         Thought Content:  Thought content normal.         Judgment: Judgment normal.         Lab Results   Component Value Date    WBC 7.4 02/21/2020    HGB 13.7 02/21/2020    HCT 40.6 02/21/2020    MCV 91.0 02/21/2020     02/21/2020     Lab Results   Component Value Date     02/21/2020    K 4.8 02/21/2020     02/21/2020    CO2 21 02/21/2020    BUN 9 02/21/2020    CREATININE 0.6 02/21/2020    GLUCOSE 64 (L) 02/21/2020    CALCIUM 9.7 02/21/2020    PROT 7.2 02/21/2020    LABALBU 4.5 02/21/2020    BILITOT 0.4 02/21/2020    ALKPHOS 40 02/21/2020    AST 57 (H) 02/21/2020    ALT 40 02/21/2020    LABGLOM >60 02/21/2020    GFRAA >60 02/21/2020    AGRATIO 1.7 02/21/2020    GLOB 2.7 02/21/2020     Lab Results   Component Value Date    CHOL 203 (H) 02/21/2020     Lab Results   Component Value Date    TRIG 94 02/21/2020     Lab Results   Component Value Date    HDL 50 02/21/2020     Lab Results   Component Value Date    LDLCALC 134 (H) 02/21/2020     Lab Results   Component Value Date    LABVLDL 19 02/21/2020         ASSESSMENT/PLAN:  1. Dizziness  Rule out arrhythmia, anemia, B12 deficiency, thyroid disorder, iron deficiency and pregnancy. May be BPPV  follo wup in one week. - CBC Auto Differential; Future  - TSH with Reflex; Future  - Ferritin; Future  - Iron and TIBC; Future  - Transferrin; Future  - VITAMIN B12 & FOLATE; Future  - EKG 12 Lead  - PREGNANCY, URINE; Future    Update:  EKG revealed normal sinus rhythm and regular rate with 1 PVC. Discussed use, benefit, and side effects of prescribed medications. Barriers to medication compliance addressed. All patient questions answered. Pt voiced understanding. RTC No follow-ups on file. No future appointments.     David Estrada MD  3/30/2021  12:45 PM

## 2021-03-30 ENCOUNTER — OFFICE VISIT (OUTPATIENT)
Dept: PRIMARY CARE CLINIC | Age: 26
End: 2021-03-30
Payer: COMMERCIAL

## 2021-03-30 VITALS
TEMPERATURE: 97.3 F | WEIGHT: 147.4 LBS | BODY MASS INDEX: 26.12 KG/M2 | DIASTOLIC BLOOD PRESSURE: 79 MMHG | HEIGHT: 63 IN | HEART RATE: 73 BPM | RESPIRATION RATE: 16 BRPM | SYSTOLIC BLOOD PRESSURE: 125 MMHG

## 2021-03-30 DIAGNOSIS — R42 DIZZINESS: Primary | ICD-10-CM

## 2021-03-30 DIAGNOSIS — R42 DIZZINESS: ICD-10-CM

## 2021-03-30 LAB — HCG(URINE) PREGNANCY TEST: NEGATIVE

## 2021-03-30 PROCEDURE — 93000 ELECTROCARDIOGRAM COMPLETE: CPT | Performed by: FAMILY MEDICINE

## 2021-03-30 PROCEDURE — 99214 OFFICE O/P EST MOD 30 MIN: CPT | Performed by: FAMILY MEDICINE

## 2021-03-30 ASSESSMENT — ENCOUNTER SYMPTOMS
DIARRHEA: 0
RHINORRHEA: 0
SORE THROAT: 0
COUGH: 0
BACK PAIN: 0
NAUSEA: 0
BLOOD IN STOOL: 0
SINUS PRESSURE: 0
CONSTIPATION: 0
SINUS PAIN: 0
VOMITING: 0
ABDOMINAL PAIN: 0
SHORTNESS OF BREATH: 0
WHEEZING: 0
CHEST TIGHTNESS: 0

## 2021-03-30 ASSESSMENT — PATIENT HEALTH QUESTIONNAIRE - PHQ9
1. LITTLE INTEREST OR PLEASURE IN DOING THINGS: 0
2. FEELING DOWN, DEPRESSED OR HOPELESS: 0
DEPRESSION UNABLE TO ASSESS: FUNCTIONAL CAPACITY MOTIVATION LIMITS ACCURACY
SUM OF ALL RESPONSES TO PHQ QUESTIONS 1-9: 0
SUM OF ALL RESPONSES TO PHQ QUESTIONS 1-9: 0

## 2021-03-31 ENCOUNTER — PATIENT MESSAGE (OUTPATIENT)
Dept: PRIMARY CARE CLINIC | Age: 26
End: 2021-03-31

## 2021-03-31 DIAGNOSIS — R42 DIZZINESS: Primary | ICD-10-CM

## 2021-03-31 DIAGNOSIS — R61 DIAPHORESIS: ICD-10-CM

## 2021-03-31 DIAGNOSIS — H53.9 VISION CHANGES: ICD-10-CM

## 2021-03-31 DIAGNOSIS — R68.89 COLD SENSITIVITY: ICD-10-CM

## 2021-03-31 LAB
BASOPHILS ABSOLUTE: 0 K/UL (ref 0–0.2)
BASOPHILS RELATIVE PERCENT: 0.5 %
EOSINOPHILS ABSOLUTE: 0.2 K/UL (ref 0–0.6)
EOSINOPHILS RELATIVE PERCENT: 2.8 %
FERRITIN: 54.3 NG/ML (ref 15–150)
FOLATE: 10.57 NG/ML (ref 4.78–24.2)
HCT VFR BLD CALC: 40.1 % (ref 36–48)
HEMOGLOBIN: 13.8 G/DL (ref 12–16)
IRON SATURATION: 38 % (ref 15–50)
IRON: 103 UG/DL (ref 37–145)
LYMPHOCYTES ABSOLUTE: 1.8 K/UL (ref 1–5.1)
LYMPHOCYTES RELATIVE PERCENT: 30.9 %
MCH RBC QN AUTO: 30.8 PG (ref 26–34)
MCHC RBC AUTO-ENTMCNC: 34.4 G/DL (ref 31–36)
MCV RBC AUTO: 89.7 FL (ref 80–100)
MONOCYTES ABSOLUTE: 0.3 K/UL (ref 0–1.3)
MONOCYTES RELATIVE PERCENT: 5.7 %
NEUTROPHILS ABSOLUTE: 3.5 K/UL (ref 1.7–7.7)
NEUTROPHILS RELATIVE PERCENT: 60.1 %
PDW BLD-RTO: 12.4 % (ref 12.4–15.4)
PLATELET # BLD: 284 K/UL (ref 135–450)
PMV BLD AUTO: 8.1 FL (ref 5–10.5)
RBC # BLD: 4.47 M/UL (ref 4–5.2)
TOTAL IRON BINDING CAPACITY: 274 UG/DL (ref 260–445)
TRANSFERRIN: 239 MG/DL (ref 200–360)
TSH REFLEX: 2.36 UIU/ML (ref 0.27–4.2)
VITAMIN B-12: 393 PG/ML (ref 211–911)
WBC # BLD: 5.8 K/UL (ref 4–11)

## 2021-04-06 NOTE — TELEPHONE ENCOUNTER
From: Paulie Cornelius MD  To: Dav Bryan  Sent: 3/31/2021 8:12 AM EDT  Subject: [employer] morning Caitlin Darío    It looks like all of the results from your blood work came back stone cold normal. This is reassuring but also does not explain the intermittent dizziness that you are getting. Given that your EKG looked normal with the one aberrant PVC, I do not think it is necessary to have a 2-week monitor unless your symptoms continue, increase in severity or frequency or you develop shortness of breath, presyncope or feel palpitations. This could be benign paroxysmal positional vertigo as well. You can try the Epley Maneuver at home to remedy this.      Dr. Olivia Vernon

## 2021-04-08 DIAGNOSIS — R68.89 COLD SENSITIVITY: ICD-10-CM

## 2021-04-08 DIAGNOSIS — R61 DIAPHORESIS: ICD-10-CM

## 2021-04-08 DIAGNOSIS — R42 DIZZINESS: ICD-10-CM

## 2021-04-08 DIAGNOSIS — H53.9 VISION CHANGES: ICD-10-CM

## 2021-04-09 LAB
ANTI-DSDNA IGG: <1 IU/ML (ref 0–9)
ANTI-NUCLEAR ANTIBODY (ANA): NEGATIVE
ANTI-SMITH IGG: <0.2 AI (ref 0–0.9)
C-REACTIVE PROTEIN: <3 MG/L (ref 0–5.1)
SEDIMENTATION RATE, ERYTHROCYTE: 5 MM/HR (ref 0–20)

## 2021-04-10 LAB — ANCA IFA: NORMAL

## 2021-04-11 ENCOUNTER — PATIENT MESSAGE (OUTPATIENT)
Dept: PRIMARY CARE CLINIC | Age: 26
End: 2021-04-11

## 2021-04-22 NOTE — PROGRESS NOTES
dizziness and light-headedness. Negative for weakness and headaches. Hematological: Does not bruise/bleed easily. Psychiatric/Behavioral: Negative for behavioral problems. No past medical history on file. Family History   Problem Relation Age of Onset    Hypertension Mother     No Known Problems Father     No Known Problems Brother     No Known Problems Brother        Social History     Tobacco Use    Smoking status: Never Smoker    Smokeless tobacco: Never Used   Substance Use Topics    Alcohol use: Yes     Comment: Socially    Drug use: Never       New Prescriptions    No medications on file       Meds Prior to visit:  Current Outpatient Medications on File Prior to Visit   Medication Sig Dispense Refill    lisdexamfetamine (VYVANSE) 30 MG capsule Take 1 capsule by mouth every morning for 30 days. 30 capsule 0    amphetamine-dextroamphetamine (ADDERALL, 5MG,) 5 MG tablet Take 1 tablet by mouth daily as needed (decreased focus/concentration) for up to 30 days. 30 tablet 0    LILLOW 0.15-30 MG-MCG per tablet        No current facility-administered medications on file prior to visit. Allergies   Allergen Reactions    Amoxicillin Hives     Unknown patient states she has been tested for that when she was younger         OBJECTIVE:  /81 (Position: Standing)   Pulse 71   Temp 97.9 °F (36.6 °C) (Temporal)   Resp 16   Wt 148 lb 3.2 oz (67.2 kg)   LMP 04/05/2021   Breastfeeding No   BMI 26.25 kg/m²   BP Readings from Last 2 Encounters:   04/23/21 119/81   03/30/21 125/79     Wt Readings from Last 3 Encounters:   04/23/21 148 lb 3.2 oz (67.2 kg)   03/30/21 147 lb 6.4 oz (66.9 kg)   02/21/20 158 lb (71.7 kg)       Physical Exam  Vitals signs reviewed. Constitutional:       General: She is not in acute distress. Appearance: She is well-developed. HENT:      Head: Normocephalic and atraumatic.       Right Ear: Tympanic membrane, ear canal and external ear normal. There is no impacted cerumen. Left Ear: Tympanic membrane, ear canal and external ear normal. There is no impacted cerumen. Mouth/Throat:      Mouth: Mucous membranes are moist.      Pharynx: Oropharynx is clear. No oropharyngeal exudate or posterior oropharyngeal erythema. Eyes:      General: No visual field deficit or scleral icterus. Right eye: No discharge. Left eye: No discharge. Conjunctiva/sclera: Conjunctivae normal.      Pupils: Pupils are equal, round, and reactive to light. Neck:      Musculoskeletal: Normal range of motion and neck supple. Cardiovascular:      Rate and Rhythm: Normal rate and regular rhythm. Heart sounds: Normal heart sounds. No murmur. Comments: Radial and pedal pulses intact  Pulmonary:      Effort: Pulmonary effort is normal. No respiratory distress. Breath sounds: Normal breath sounds. No wheezing or rales. Chest:      Chest wall: No tenderness. Abdominal:      General: Bowel sounds are normal.      Palpations: Abdomen is soft. Tenderness: There is no abdominal tenderness. There is no guarding. Comments: Normal liver and spleen. No organomegaly   Musculoskeletal: Normal range of motion. General: No tenderness. Comments: Intact in all extremities   Lymphadenopathy:      Cervical: No cervical adenopathy. Skin:     General: Skin is warm. Findings: No erythema or rash. Neurological:      General: No focal deficit present. Mental Status: She is alert and oriented to person, place, and time. Mental status is at baseline. GCS: GCS eye subscore is 4. GCS verbal subscore is 5. GCS motor subscore is 6. Cranial Nerves: Cranial nerves are intact. Sensory: No sensory deficit. Motor: Motor function is intact. No weakness, tremor, atrophy, abnormal muscle tone, seizure activity or pronator drift. Coordination: Romberg sign negative.  Coordination normal. Finger-Nose-Finger Test and Heel to [facility] Test normal. Rapid alternating movements normal.      Gait: Gait is intact. Gait and tandem walk normal.      Deep Tendon Reflexes: Reflexes normal. Babinski sign absent on the right side. Babinski sign absent on the left side. Reflex Scores:       Tricep reflexes are 1+ on the right side and 1+ on the left side. Bicep reflexes are 1+ on the right side and 1+ on the left side. Brachioradialis reflexes are 1+ on the right side and 1+ on the left side. Patellar reflexes are 2+ on the right side and 2+ on the left side. Achilles reflexes are 1+ on the right side and 1+ on the left side. Psychiatric:         Mood and Affect: Mood normal.         Behavior: Behavior normal.         Thought Content: Thought content normal.         Judgment: Judgment normal.         Lab Results   Component Value Date    WBC 5.8 03/30/2021    HGB 13.8 03/30/2021    HCT 40.1 03/30/2021    MCV 89.7 03/30/2021     03/30/2021     Lab Results   Component Value Date     02/21/2020    K 4.8 02/21/2020     02/21/2020    CO2 21 02/21/2020    BUN 9 02/21/2020    CREATININE 0.6 02/21/2020    GLUCOSE 64 (L) 02/21/2020    CALCIUM 9.7 02/21/2020    PROT 7.2 02/21/2020    LABALBU 4.5 02/21/2020    BILITOT 0.4 02/21/2020    ALKPHOS 40 02/21/2020    AST 57 (H) 02/21/2020    ALT 40 02/21/2020    LABGLOM >60 02/21/2020    GFRAA >60 02/21/2020    AGRATIO 1.7 02/21/2020    GLOB 2.7 02/21/2020     Lab Results   Component Value Date    CHOL 203 (H) 02/21/2020     Lab Results   Component Value Date    TRIG 94 02/21/2020     Lab Results   Component Value Date    HDL 50 02/21/2020     Lab Results   Component Value Date    LDLCALC 134 (H) 02/21/2020     Lab Results   Component Value Date    LABVLDL 19 02/21/2020     No results found for: LABA1C      ASSESSMENT/PLAN:  1. Dizziness  Hard to decipher if patient is experiencing dizziness or lightheadedness or little bit of both.   May be caused by the intermittent palpitation she is getting -previous EKG was normal as well as TSH and electrolytes. Is also experiencing tinnitus now. Vertigo and BPPV is still in the differential.  There is also Ménière's disease, vestibular neuronitis or vestibular migraines. I want to rule out MS or cerebellar ataxia. Follow-up MRI  - MRI BRAIN W WO CONTRAST; Future    2. Palpitations  Complete MRI and then get Zio patch through cardiology. Given information to get this placed  - Cardiac event monitor; Future    3. Episodic lightheadedness  As above  - MRI BRAIN W WO CONTRAST; Future  - Cardiac event monitor; Future        Discussed use, benefit, and side effects of prescribed medications. Barriers to medication compliance addressed. All patient questions answered. Pt voiced understanding. RTC Return in about 4 weeks (around 5/21/2021).     Future Appointments   Date Time Provider Willi Ponce   4/29/2021  1:00 PM [facility] MRI RM 1 [id number] MRI Hieu Wilburn MD  4/23/2021  1:30 PM

## 2021-04-23 ENCOUNTER — OFFICE VISIT (OUTPATIENT)
Dept: PRIMARY CARE CLINIC | Age: 26
End: 2021-04-23
Payer: COMMERCIAL

## 2021-04-23 VITALS
SYSTOLIC BLOOD PRESSURE: 119 MMHG | WEIGHT: 148.2 LBS | DIASTOLIC BLOOD PRESSURE: 81 MMHG | TEMPERATURE: 97.9 F | BODY MASS INDEX: 26.25 KG/M2 | HEART RATE: 71 BPM | RESPIRATION RATE: 16 BRPM

## 2021-04-23 DIAGNOSIS — R42 DIZZINESS: Primary | ICD-10-CM

## 2021-04-23 DIAGNOSIS — R42 EPISODIC LIGHTHEADEDNESS: ICD-10-CM

## 2021-04-23 DIAGNOSIS — R00.2 PALPITATIONS: ICD-10-CM

## 2021-04-23 PROCEDURE — 99214 OFFICE O/P EST MOD 30 MIN: CPT | Performed by: FAMILY MEDICINE

## 2021-04-23 ASSESSMENT — ENCOUNTER SYMPTOMS
SHORTNESS OF BREATH: 0
ABDOMINAL PAIN: 0
COUGH: 0
CONSTIPATION: 0
EYE PAIN: 0
DIARRHEA: 0

## 2021-04-26 ENCOUNTER — PATIENT MESSAGE (OUTPATIENT)
Dept: PRIMARY CARE CLINIC | Age: 26
End: 2021-04-26

## 2021-04-26 DIAGNOSIS — F90.0 ADHD (ATTENTION DEFICIT HYPERACTIVITY DISORDER), INATTENTIVE TYPE: ICD-10-CM

## 2021-04-26 RX ORDER — DEXTROAMPHETAMINE SACCHARATE, AMPHETAMINE ASPARTATE, DEXTROAMPHETAMINE SULFATE AND AMPHETAMINE SULFATE 1.25; 1.25; 1.25; 1.25 MG/1; MG/1; MG/1; MG/1
5 TABLET ORAL DAILY PRN
Qty: 30 TABLET | Refills: 0 | Status: SHIPPED | OUTPATIENT
Start: 2021-04-26 | End: 2021-06-07 | Stop reason: SDUPTHER

## 2021-04-26 NOTE — TELEPHONE ENCOUNTER
Last appt: 4/23/2021  Next appt: Visit date not found  Due to return: 5/21/21    Last PDMP [facility] as Reviewed:  Review User Review Instant Review Result   Polo Rincon 3/11/2021  3:48 PM Reviewed PDMP [1]

## 2021-04-29 ENCOUNTER — HOSPITAL ENCOUNTER (OUTPATIENT)
Dept: MRI IMAGING | Age: 26
Discharge: HOME | End: 2021-04-29
Payer: COMMERCIAL

## 2021-04-29 DIAGNOSIS — R42 DIZZINESS: Primary | ICD-10-CM

## 2021-04-29 DIAGNOSIS — H53.9 VISION CHANGES: ICD-10-CM

## 2021-04-29 DIAGNOSIS — R42 EPISODIC LIGHTHEADEDNESS: ICD-10-CM

## 2021-04-29 DIAGNOSIS — G93.0 ARACHNOID CYST: ICD-10-CM

## 2021-04-29 DIAGNOSIS — R42 DIZZINESS: ICD-10-CM

## 2021-04-29 PROCEDURE — 6360000004 HC RX CONTRAST MEDICATION: Performed by: FAMILY MEDICINE

## 2021-04-29 PROCEDURE — 70553 MRI BRAIN STEM W/O & W/DYE: CPT

## 2021-04-29 PROCEDURE — A9579 GAD-BASE MR CONTRAST NOS,1ML: HCPCS | Performed by: FAMILY MEDICINE

## 2021-04-29 RX ADMIN — GADOTERIDOL 14 ML: 279.3 INJECTION, SOLUTION INTRAVENOUS at 13:10

## 2021-04-30 NOTE — TELEPHONE ENCOUNTER
----- Message from [employer] sent at 4/30/2021  9:29 AM EDT -----  Subject: Referral Request    QUESTIONS   Reason for referral request? Pt was called to set up appt with a   neurologist   Has the physician seen you for this condition before? Yes  Select a date? 2021-04-21  Select the physician (PCP or Specialist)? Viraj Brennan   Preferred Specialist (if applicable)? Do you already have an appointment scheduled? No  Additional Information for Provider?   ---------------------------------------------------------------------------  --------------  CALL BACK INFO  What is the best way for the office to contact you? OK to leave message on   voicemail  Preferred Call Back Phone Number?  [phone]

## 2021-04-30 NOTE — TELEPHONE ENCOUNTER
----- Message from [employer] sent at 4/30/2021  9:29 AM EDT -----  Subject: Referral Request    QUESTIONS   Reason for referral request? Pt was called to set up appt with a   neurologist   Has the physician seen you for this condition before? Yes  Select a date? 2021-04-21  Select the physician (PCP or Specialist)? Viraj Brennan   Preferred Specialist (if applicable)? Do you already have an appointment scheduled? No  Additional Information for Provider?   ---------------------------------------------------------------------------  --------------  CALL BACK INFO  What is the best way for the office to contact you? OK to leave message on   voicemail  Preferred Call Back Phone Number?  [phone]

## 2021-06-07 DIAGNOSIS — F90.0 ADHD (ATTENTION DEFICIT HYPERACTIVITY DISORDER), INATTENTIVE TYPE: ICD-10-CM

## 2021-06-07 RX ORDER — DEXTROAMPHETAMINE SACCHARATE, AMPHETAMINE ASPARTATE, DEXTROAMPHETAMINE SULFATE AND AMPHETAMINE SULFATE 1.25; 1.25; 1.25; 1.25 MG/1; MG/1; MG/1; MG/1
5 TABLET ORAL DAILY PRN
Qty: 30 TABLET | Refills: 0 | Status: SHIPPED | OUTPATIENT
Start: 2021-06-07 | End: 2021-07-21 | Stop reason: SDUPTHER

## 2021-07-21 DIAGNOSIS — F90.0 ADHD (ATTENTION DEFICIT HYPERACTIVITY DISORDER), INATTENTIVE TYPE: ICD-10-CM

## 2021-07-21 RX ORDER — DEXTROAMPHETAMINE SACCHARATE, AMPHETAMINE ASPARTATE, DEXTROAMPHETAMINE SULFATE AND AMPHETAMINE SULFATE 1.25; 1.25; 1.25; 1.25 MG/1; MG/1; MG/1; MG/1
5 TABLET ORAL DAILY PRN
Qty: 30 TABLET | Refills: 0 | Status: SHIPPED | OUTPATIENT
Start: 2021-07-21 | End: 2021-09-11 | Stop reason: SDUPTHER

## 2021-07-30 ENCOUNTER — TELEPHONE (OUTPATIENT)
Dept: CARDIOLOGY CLINIC | Age: 26
End: 2021-07-30

## 2021-08-06 ENCOUNTER — TELEPHONE (OUTPATIENT)
Dept: CARDIOLOGY CLINIC | Age: 26
End: 2021-08-06

## 2021-08-06 PROCEDURE — 93246 EXT ECG>7D<15D RECORDING: CPT | Performed by: INTERNAL MEDICINE

## 2021-08-06 NOTE — TELEPHONE ENCOUNTER
14 day Zio placed in [facility] office for palpitations, referred by Matthew Keita PCP # [id number] , care and mailing instructions explained to patient, she expressed understanding

## 2021-08-22 NOTE — PROGRESS NOTES
Chief Complaint   Patient presents with    Dizziness     has talked to park    Eye Problem         HPI:  Concha Antunez is a 30 y.o. (: 1995) here today for continued symptoms of vision changes, lightheadedness to the point that it is effecting her walking. Last visit with neuro plan:  --> referred to cardiology for tilt table  --> DCed effexor   --> continue migraine med; triptan. Keep food diary    She recently had a Zio patch placed on 2021 and is turning it in today we will let her know what it shows regarding arrhythmias or abnormalities when it is read by cardiology. Symptoms of lightheadedness and seeing spots is always worse when she is standing for long periods of time or walking for long periods of time. It can get overwhelming at times and she needs to sit down. Difficult to tell if it is worse or more frequent. Her maternal grandma had issue with heart valve at 28 yo and then HF symptoms early on. Review of Systems   Constitutional: Negative for appetite change, chills, fatigue and fever. HENT: Negative for congestion. Eyes: Positive for visual disturbance. Negative for pain. Respiratory: Negative for cough and shortness of breath. Cardiovascular: Negative for chest pain and palpitations. Gastrointestinal: Negative for abdominal pain, constipation and diarrhea. Genitourinary: Negative for difficulty urinating. Musculoskeletal: Negative for arthralgias. Skin: Negative for rash and wound. Neurological: Positive for light-headedness. Negative for dizziness, weakness and headaches. Hematological: Does not bruise/bleed easily. Psychiatric/Behavioral: Negative for behavioral problems. History reviewed. No pertinent past medical history.     Family History   Problem Relation Age of Onset    Hypertension Mother     No Known Problems Father     No Known Problems Brother     No Known Problems Brother        Social History     Tobacco Use    Smoking status: Never Smoker    Smokeless tobacco: Never Used   Vaping Use    Vaping Use: Never used   Substance Use Topics    Alcohol use: Yes     Comment: Socially    Drug use: Never       New Prescriptions    No medications on file       Meds Prior to visit:  Current Outpatient Medications on File Prior to Visit   Medication Sig Dispense Refill    amphetamine-dextroamphetamine (ADDERALL, 5MG,) 5 MG tablet Take 1 tablet by mouth daily as needed (decreased focus/concentration) for up to 30 days. 30 tablet 0    lisdexamfetamine (VYVANSE) 30 MG capsule Take 1 capsule by mouth every morning for 30 days. 30 capsule 0    LILLOW 0.15-30 MG-MCG per tablet        No current facility-administered medications on file prior to visit. Allergies   Allergen Reactions    Amoxicillin Hives     Unknown patient states she has been tested for that when she was younger         OBJECTIVE:  /83   Pulse 80   Temp 98.3 °F (36.8 °C) (Temporal)   Resp 16   Wt 143 lb 6.4 oz (65 kg)   LMP 07/28/2021   Breastfeeding No   BMI 25.40 kg/m²   BP Readings from Last 2 Encounters:   08/23/21 121/83   04/23/21 119/81     Wt Readings from Last 3 Encounters:   08/23/21 143 lb 6.4 oz (65 kg)   04/23/21 148 lb 3.2 oz (67.2 kg)   03/30/21 147 lb 6.4 oz (66.9 kg)       Physical Exam  Vitals reviewed. Constitutional:       General: She is not in acute distress. Appearance: Normal appearance. She is not ill-appearing. HENT:      Head: Normocephalic and atraumatic. Right Ear: Tympanic membrane, ear canal and external ear normal. There is no impacted cerumen. Left Ear: Tympanic membrane, ear canal and external ear normal. There is no impacted cerumen. Nose: Nose normal. No congestion. Mouth/Throat:      Mouth: Mucous membranes are moist.      Pharynx: Oropharynx is clear. No oropharyngeal exudate. Eyes:      Extraocular Movements: Extraocular movements intact.       Conjunctiva/sclera: Conjunctivae normal.      Pupils: Pupils are equal, round, and reactive to light. Cardiovascular:      Rate and Rhythm: Normal rate and regular rhythm. Pulses: Normal pulses. Heart sounds: Normal heart sounds. No murmur heard. No gallop. Pulmonary:      Effort: Pulmonary effort is normal. No respiratory distress. Breath sounds: Normal breath sounds. No stridor. No wheezing, rhonchi or rales. Abdominal:      General: Bowel sounds are normal.      Palpations: Abdomen is soft. Tenderness: There is no abdominal tenderness. Musculoskeletal:         General: Normal range of motion. Cervical back: Normal range of motion and neck supple. Right lower leg: No edema. Left lower leg: No edema. Skin:     General: Skin is warm. Capillary Refill: Capillary refill takes less than 2 seconds. Findings: No erythema or rash. Neurological:      General: No focal deficit present. Mental Status: She is alert and oriented to person, place, and time. Mental status is at baseline. Motor: No weakness. Coordination: Coordination normal.      Gait: Gait normal.   Psychiatric:         Mood and Affect: Mood normal.         Behavior: Behavior normal.         Thought Content:  Thought content normal.         Judgment: Judgment normal.         Lab Results   Component Value Date    WBC 5.8 03/30/2021    HGB 13.8 03/30/2021    HCT 40.1 03/30/2021    MCV 89.7 03/30/2021     03/30/2021     Lab Results   Component Value Date     02/21/2020    K 4.8 02/21/2020     02/21/2020    CO2 21 02/21/2020    BUN 9 02/21/2020    CREATININE 0.6 02/21/2020    GLUCOSE 64 (L) 02/21/2020    CALCIUM 9.7 02/21/2020    PROT 7.2 02/21/2020    LABALBU 4.5 02/21/2020    BILITOT 0.4 02/21/2020    ALKPHOS 40 02/21/2020    AST 57 (H) 02/21/2020    ALT 40 02/21/2020    LABGLOM >60 02/21/2020    GFRAA >60 02/21/2020    AGRATIO 1.7 02/21/2020    GLOB 2.7 02/21/2020     Lab Results   Component Value Date    CHOL 203 (H) 02/21/2020     Lab Results   Component Value Date    TRIG 94 02/21/2020     Lab Results   Component Value Date    HDL 50 02/21/2020     Lab Results   Component Value Date    LDLCALC 134 (H) 02/21/2020     Lab Results   Component Value Date    LABVLDL 19 02/21/2020     No results found for: LABA1C      ASSESSMENT/PLAN:  1. Episodic lightheadedness  Follow-up Zio patch results and recommendations per cardiology  Follow-up with neurology  Neurology recommend tilt table test which is scheduled in November  Follow-up those results and recommendations after neuro follow-up    2. Vision changes  As above  May be an arrhythmia causing the lightheadedness and vision changes  Making note of any vision changes that are new    3. Vestibular migraine  Continue neurology recommendations with triptan and food diary  Patient was unaware of keeping food diary per last neurology note and will start today in relation to her headaches  She states the headache frequency is not as bad      Discussed use, benefit, and side effects of prescribed medications. Barriers to medication compliance addressed. All patient questions answered. Pt voiced understanding. RTC Return if symptoms worsen or fail to improve.     Future Appointments   Date Time Provider Willi Ponce   11/1/2021  9:30 AM Viviana Sheikh MD [facility]       Naga Doan MD  8/23/2021  10:04 AM

## 2021-08-23 ENCOUNTER — OFFICE VISIT (OUTPATIENT)
Dept: PRIMARY CARE CLINIC | Age: 26
End: 2021-08-23
Payer: COMMERCIAL

## 2021-08-23 VITALS
HEART RATE: 80 BPM | RESPIRATION RATE: 16 BRPM | DIASTOLIC BLOOD PRESSURE: 83 MMHG | SYSTOLIC BLOOD PRESSURE: 121 MMHG | BODY MASS INDEX: 25.4 KG/M2 | TEMPERATURE: 98.3 F | WEIGHT: 143.4 LBS

## 2021-08-23 DIAGNOSIS — G43.809 VESTIBULAR MIGRAINE: ICD-10-CM

## 2021-08-23 DIAGNOSIS — R42 EPISODIC LIGHTHEADEDNESS: Primary | ICD-10-CM

## 2021-08-23 DIAGNOSIS — H53.9 VISION CHANGES: ICD-10-CM

## 2021-08-23 PROCEDURE — 99214 OFFICE O/P EST MOD 30 MIN: CPT | Performed by: FAMILY MEDICINE

## 2021-08-23 ASSESSMENT — ENCOUNTER SYMPTOMS
CONSTIPATION: 0
SHORTNESS OF BREATH: 0
COUGH: 0
DIARRHEA: 0
ABDOMINAL PAIN: 0
EYE PAIN: 0

## 2021-09-07 PROCEDURE — 93248 EXT ECG>7D<15D REV&INTERPJ: CPT | Performed by: INTERNAL MEDICINE

## 2021-09-09 DIAGNOSIS — R00.2 PALPITATIONS: ICD-10-CM

## 2021-09-09 PROCEDURE — 93248 EXT ECG>7D<15D REV&INTERPJ: CPT | Performed by: INTERNAL MEDICINE

## 2021-09-11 DIAGNOSIS — F90.0 ADHD (ATTENTION DEFICIT HYPERACTIVITY DISORDER), INATTENTIVE TYPE: ICD-10-CM

## 2021-09-14 RX ORDER — DEXTROAMPHETAMINE SACCHARATE, AMPHETAMINE ASPARTATE, DEXTROAMPHETAMINE SULFATE AND AMPHETAMINE SULFATE 1.25; 1.25; 1.25; 1.25 MG/1; MG/1; MG/1; MG/1
5 TABLET ORAL DAILY PRN
Qty: 30 TABLET | Refills: 0 | Status: SHIPPED | OUTPATIENT
Start: 2021-09-14 | End: 2021-10-27 | Stop reason: SDUPTHER

## 2021-09-15 DIAGNOSIS — R00.2 PALPITATION: ICD-10-CM

## 2021-09-16 NOTE — PROGRESS NOTES
2021    TELEHEALTH EVALUATION -- Audio/Visual (During [id number]-[id number] public health emergency)    HPI:    Mahendra Villalobos (:  1995) has requested an audio/video evaluation for the following concern(s):    Has tilt table test schedule for 2021  She is wondering if these episodes of lightheadedness and palpitations are secondary to anxiety. While on vacation she had a episode of the symptoms along with fast respiratory rate. The provider asked her if she had issues with anxiety because it looked like an anxiety attack. This got her thinking. 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 A lot of work stress--> she has been thinking about asking her boss for decreased hours on account of being stretched so thin. She has never tried therapy or medications for anxiety but feels like she has been dealing with this for a long time, now to the point where it is coming out in physical symptoms. Review of Systems   Constitutional: Negative for appetite change, chills, fatigue and fever. HENT: Negative for congestion. Eyes: Negative for pain and visual disturbance. Respiratory: Negative for cough and shortness of breath. Cardiovascular: Negative for chest pain and palpitations. Gastrointestinal: Negative for abdominal pain, constipation and diarrhea. Genitourinary: Negative for difficulty urinating. Musculoskeletal: Negative for arthralgias. Skin: Negative for rash and wound. Neurological: Negative for dizziness, weakness, light-headedness and headaches. Hematological: Does not bruise/bleed easily. Psychiatric/Behavioral: Negative for behavioral problems. Prior to Visit Medications    Medication Sig Taking?  Authorizing Provider   sertraline (ZOLOFT) 25 MG tablet Take 1 tablet by mouth daily Yes Ivonne Marie MD   amphetamine-dextroamphetamine (ADDERALL, 5MG,) 5 MG tablet Take 1 tablet by mouth daily as needed (decreased focus/concentration) for up to 30 days. Serg Vaughan MD   lisdexamfetamine (VYVANSE) 30 MG capsule Take 1 capsule by mouth every morning for 30 days. Serg Vaughan MD   LILLOW 0.15-30 MG-MCG per tablet   Historical Provider, MD       Social History     Tobacco Use    Smoking status: Never Smoker    Smokeless tobacco: Never Used   Vaping Use    Vaping Use: Never used   Substance Use Topics    Alcohol use: Yes     Comment: Socially    Drug use: Never        Allergies   Allergen Reactions    Amoxicillin Hives     Unknown patient states she has been tested for that when she was younger     , History reviewed. No pertinent past medical history. , History reviewed. No pertinent surgical history. ,   Social History     Tobacco Use    Smoking status: Never Smoker    Smokeless tobacco: Never Used   Vaping Use    Vaping Use: Never used   Substance Use Topics    Alcohol use: Yes     Comment: Socially    Drug use: Never   ,   Family History   Problem Relation Age of Onset    Hypertension Mother     No Known Problems Father     No Known Problems Brother     No Known Problems Brother    ,   Immunization History   Administered Date(s) Administered    COVID-19, Burns Peter, PF, 30mcg/0.3mL 04/01/2021, 04/25/2021    DTP 1993, 10/28/1998    DTP/HiB 1993, 02/04/1994    DTaP (Infanrix) 04/24/1995    HPV Quadrivalent (Gardasil) 01/04/2006, 10/31/2006, 08/22/2007    Hepatitis A Ped/Adol (Havrix, Vaqta) 08/22/2007, 06/12/2009    Hepatitis B 1993, 02/04/1994, 07/08/1994    Hib vaccine 1993, 02/04/1994, 04/08/1994, 10/17/1994    MMR 02/13/1995, 10/28/1998    Meningococcal MCV4P (Menactra) 08/22/2007, 08/09/2012    Polio OPV 1993, 02/04/1994, 04/24/1995, 10/28/1998    Tdap (Boostrix, Adacel) 10/31/2006    Varicella (Varivax) 11/30/1995, 10/31/2006   ,   Health Maintenance   Topic Date Due    Hepatitis C screen  Never done    HIV screen  Never done    Pap smear Never done    DTaP/Tdap/Td vaccine (6 - Td or Tdap) 10/31/2016    Flu vaccine (1) Never done    Hepatitis A vaccine  Completed    Hepatitis B vaccine  Completed    Hib vaccine  Completed    Varicella vaccine  Completed    Meningococcal (ACWY) vaccine  Completed    COVID-19 Vaccine  Completed    Pneumococcal 0-64 years Vaccine  Aged Out       PHYSICAL EXAMINATION:  [ INSTRUCTIONS:  \"[x]\" Indicates a positive item  \"[]\" Indicates a negative item  -- DELETE ALL ITEMS NOT EXAMINED]  [x] Alert  [x] Oriented to person/place/time    [x] No apparent distress  [] Toxic appearing    [] Face flushed appearing [x] Sclera clear  [] Lips are cyanotic      [x] Breathing appears normal  [] Appears tachypneic      [] Rash on visible skin    [] Cranial Nerves II-XII grossly intact    [] Motor grossly intact in visible upper extremities    [] Motor grossly intact in visible lower extremities    [x] Normal Mood  [] Anxious appearing    [] Depressed appearing  [] Confused appearing      Due to this being a TeleHealth encounter, evaluation of the following organ systems is limited: Vitals/Constitutional/EENT/Resp/CV/GI//MS/Neuro/Skin/Heme-Lymph-Imm. ASSESSMENT/PLAN:  1. Episodic lightheadedness  Continues to happen especially when she is driving in traffic. She will feel a lightheadedness and start breathing fast and may or may not feel palpitations. She feels that now it may be due to anxiety versus something going on with her heart. Reviewed Zio patch results without her diary from cardiology's read. All questions answered. We will follow up with me in about 4 weeks after trying SSRI to treat anxiety to hopefully decrease these, what seemed to be, secondary symptoms  - sertraline (ZOLOFT) 25 MG tablet; Take 1 tablet by mouth daily  Dispense: 90 tablet; Refill: 1    2. Dizziness  Continues to happen especially when she is driving in traffic.   She will feel a lightheadedness and start breathing fast and may or may not feel palpitations. She feels that now it may be due to anxiety versus something going on with her heart. Follow-up with specialist for tilt table test.  We will follow up with me in about 4 weeks after trying SSRI to treat anxiety to hopefully decrease these, what seemed to be, secondary symptoms  - sertraline (ZOLOFT) 25 MG tablet; Take 1 tablet by mouth daily  Dispense: 90 tablet; Refill: 1    3. Palpitations  Continues to happen especially when she is driving in traffic. She will feel a lightheadedness and start breathing fast and may or may not feel palpitations. She feels that now it may be due to anxiety versus something going on with her heart. Reviewed Zio patch results without her diary from cardiology's read. All questions answered. We will follow up with me in about 4 weeks after trying SSRI to treat anxiety to hopefully decrease these, what seemed to be, secondary symptoms  - sertraline (ZOLOFT) 25 MG tablet; Take 1 tablet by mouth daily  Dispense: 90 tablet; Refill: 1    4. Anxiety  Anxiety while driving in traffic, and large crowds  Lots of life stressors and stresses at work  Politely declining behavioral health at this time  Open to starting SSRI  Follow-up in 4 weeks to gauge improvement and titrate accordingly  - sertraline (ZOLOFT) 25 MG tablet; Take 1 tablet by mouth daily  Dispense: 90 tablet; Refill: 1      No follow-ups on file. Future Appointments   Date Time Provider Willi Ponce   9/17/2021  1:30 PM Bard John MD [facility]   11/1/2021  9:30 AM Bernice Michael MD [facility]       An  electronic signature was used to authenticate this note. --Bard John MD on 9/17/2021 at 1:02 PM    {Coding Help -- Use CPT 08268-02493 with EM elements or Time rules for Office Visits. :    >20 minutes were spent on the digital evaluation and management of this patient.     Pursuant to the emergency declaration under the [address] Emergencies Act, 1135 waiver authority and the Coronavirus Preparedness and Response Supplemental Appropriations Act, this Virtual  Visit was conducted, with patient's consent, to reduce the patient's risk of exposure to COVID-19 and provide continuity of care for an established patient. Services were provided through a video synchronous discussion virtually to substitute for in-person clinic visit.

## 2021-09-17 ENCOUNTER — VIRTUAL VISIT (OUTPATIENT)
Dept: PRIMARY CARE CLINIC | Age: 26
End: 2021-09-17
Payer: COMMERCIAL

## 2021-09-17 DIAGNOSIS — R42 EPISODIC LIGHTHEADEDNESS: ICD-10-CM

## 2021-09-17 DIAGNOSIS — R00.2 PALPITATIONS: ICD-10-CM

## 2021-09-17 DIAGNOSIS — F41.9 ANXIETY: Primary | ICD-10-CM

## 2021-09-17 DIAGNOSIS — R42 DIZZINESS: ICD-10-CM

## 2021-09-17 PROCEDURE — VIRTUALHLTH VIRTUAL HEALTH SAME DAY: Performed by: FAMILY MEDICINE

## 2021-09-17 PROCEDURE — 99214 OFFICE O/P EST MOD 30 MIN: CPT | Performed by: FAMILY MEDICINE

## 2021-09-17 RX ORDER — SERTRALINE HYDROCHLORIDE 25 MG/1
25 TABLET, FILM COATED ORAL DAILY
Qty: 90 TABLET | Refills: 1 | Status: SHIPPED | OUTPATIENT
Start: 2021-09-17 | End: 2021-11-02 | Stop reason: SDUPTHER

## 2021-09-17 ASSESSMENT — ENCOUNTER SYMPTOMS
DIARRHEA: 0
CONSTIPATION: 0
SHORTNESS OF BREATH: 0
ABDOMINAL PAIN: 0
EYE PAIN: 0
COUGH: 0

## 2021-10-18 DIAGNOSIS — F90.0 ADHD (ATTENTION DEFICIT HYPERACTIVITY DISORDER), INATTENTIVE TYPE: ICD-10-CM

## 2021-10-19 NOTE — TELEPHONE ENCOUNTER
Medication:   Requested Prescriptions     Pending Prescriptions Disp Refills    lisdexamfetamine (VYVANSE) 30 MG capsule 30 capsule 0     Sig: Take 1 capsule by mouth every morning for 30 days. Last Filled:      Patient Phone Number: [phone] (home)     Last appt: 9/17/2021   Next appt: Visit date not found    Last OARRS:   RX Monitoring 9/14/2021   Periodic Controlled Substance Monitoring No signs of potential drug abuse or diversion identified.

## 2021-10-27 DIAGNOSIS — F90.0 ADHD (ATTENTION DEFICIT HYPERACTIVITY DISORDER), INATTENTIVE TYPE: ICD-10-CM

## 2021-10-28 RX ORDER — DEXTROAMPHETAMINE SACCHARATE, AMPHETAMINE ASPARTATE, DEXTROAMPHETAMINE SULFATE AND AMPHETAMINE SULFATE 1.25; 1.25; 1.25; 1.25 MG/1; MG/1; MG/1; MG/1
5 TABLET ORAL DAILY PRN
Qty: 30 TABLET | Refills: 0 | Status: SHIPPED | OUTPATIENT
Start: 2021-10-28 | End: 2021-12-10 | Stop reason: SDUPTHER

## 2021-11-01 ENCOUNTER — OFFICE VISIT (OUTPATIENT)
Dept: CARDIOLOGY CLINIC | Age: 26
End: 2021-11-01
Payer: COMMERCIAL

## 2021-11-01 VITALS
SYSTOLIC BLOOD PRESSURE: 100 MMHG | WEIGHT: 148.8 LBS | HEART RATE: 72 BPM | DIASTOLIC BLOOD PRESSURE: 60 MMHG | BODY MASS INDEX: 26.36 KG/M2

## 2021-11-01 DIAGNOSIS — R42 LIGHT HEADEDNESS: ICD-10-CM

## 2021-11-01 DIAGNOSIS — I49.1 PAC (PREMATURE ATRIAL CONTRACTION): ICD-10-CM

## 2021-11-01 DIAGNOSIS — R00.2 PALPITATIONS: Primary | ICD-10-CM

## 2021-11-01 DIAGNOSIS — I49.3 PVC (PREMATURE VENTRICULAR CONTRACTION): ICD-10-CM

## 2021-11-01 DIAGNOSIS — I95.1 ORTHOSTASIS: ICD-10-CM

## 2021-11-01 PROCEDURE — 93000 ELECTROCARDIOGRAM COMPLETE: CPT | Performed by: INTERNAL MEDICINE

## 2021-11-01 PROCEDURE — 99204 OFFICE O/P NEW MOD 45 MIN: CPT | Performed by: INTERNAL MEDICINE

## 2021-11-01 NOTE — PROGRESS NOTES
27 y.o. referred by [facility] for [facility]. LH been present for about a year now. Worse when driving/moving around at end of day after sitting most of the day. Unsteady on feet; no vertigo. Has floaters in eyes, unsteadiness. Floaters may be more constant. Saw neurologist and they don't have an answer. Had an MRI done that didn't give an answer. Possibly related to bp swings? BP gets into 90's at times; can be as high as 130's. Had monitoring for ~14 days; pvc and pacs. Happens worse going from sitting to standing. Almost never happens when standing alone. However, also has episodes when driving. Palps a couple x per week. She's vaccinated; no known prior COVID. Recently started zoloft for anxiety. Drinks EtOH on weekends. No smoking. PMH:  PAC  PVC  Palps  LH    No past surgical history on file. No prior cardiac procedures    Social History     Tobacco Use    Smoking status: Never Smoker    Smokeless tobacco: Never Used   Vaping Use    Vaping Use: Never used   Substance Use Topics    Alcohol use: Yes     Comment: Socially    Drug use: Never     Allergies   Allergen Reactions    Amoxicillin Hives     Unknown patient states she has been tested for that when she was younger       Family History   Problem Relation Age of Onset    Hypertension Mother     No Known Problems Father     No Known Problems Brother     No Known Problems Brother      Review of Systems   General: No fevers, chills, fatigue, or night sweats. No abnormal changes in weight. HEENT: No blurry or decreased vision. No changes in hearing, nasal discharge or sore throat. Cardiovascular: See HPI. No cramping in legs or buttocks when walking. Respiratory: No cough, hemoptysis, or wheezing. No history of asthma. Gastrointestinal: No abdominal pain, hematochezia, melana, or history of GI ulcers. Genito-Urinary: No dysuria or hematuria. No urgency or polyuria.    Musculoskeletal: No complaints of joint pain, joint swelling or muscular weakness/soreness. Neurological: No dizziness or headaches. No numbness/tingling, speech problems or weakness. No history of a stroke or TIA. Psychological: No anxiety or depression  Hematological and Lymphatic: No abnormal bleeding or bruising, blood clots, jaundice. Endocrine: No malaise/lethargy, palpitations, polydipsia/polyuria, temperature intolerance or unexpected weight changes. Skin: No rashes or non-healing ulcers. PE:  Blood pressure 100/60, pulse 72, weight 148 lb 12.8 oz (67.5 kg), not currently breastfeeding. General (appearance): Well devel. No distress  Eyes: Anicteric. EOMI  Neck: Supple. No JVD  Ears/Nose/Mouth/Thorat: No cyanosis  CV: RRR. No m/r/g   Respiratory:  Clear B, normal respiratory effort  GI: abd s/nt/nd  Skin: Warm, dry. No rashes  Neuro/Psych: Alert and oriented x 3. Appropriate behavior  Ext:  No c/c. No edema  Pulses:  2+ radial B    Lab Results   Component Value Date    WBC 5.8 03/30/2021    HGB 13.8 03/30/2021    HCT 40.1 03/30/2021    MCV 89.7 03/30/2021     03/30/2021     Lab Results   Component Value Date     02/21/2020    K 4.8 02/21/2020     02/21/2020    CO2 21 02/21/2020    BUN 9 02/21/2020    CREATININE 0.6 02/21/2020    GLUCOSE 64 (L) 02/21/2020    CALCIUM 9.7 02/21/2020    PROT 7.2 02/21/2020    LABALBU 4.5 02/21/2020    BILITOT 0.4 02/21/2020    ALKPHOS 40 02/21/2020    AST 57 (H) 02/21/2020    ALT 40 02/21/2020    LABGLOM >60 02/21/2020    GFRAA >60 02/21/2020    AGRATIO 1.7 02/21/2020    GLOB 2.7 02/21/2020     No results found for: INR, PROTIME    4/2021 MRI Brain: Small arachonid cyst along the anterior aspect of the left middle cranial fossa. No acute abnl. 11/01/21 ECG: NSR    2021 Zio reivewed: PAC, PVC    A/P:  27 y.o. here for LH. 1. Palpitations    2. Light headedness    3. Orthostasis    4. PAC (premature atrial contraction)    5.  PVC (premature ventricular contraction)      Recs:  -Sx with position changes sound like orthotasis. Try compression stox, hydration, liberalize salt. -Sx while driving do NOT seem like orthostasis. Will get echo. -If sx not improved with Zoloft and echo is normal (as I expect it to be), would ask her to see EP for poss Tilt testing. Will get appt and cancel if sx improve and echo ok. Gay Milton MD, [facility]

## 2021-11-02 DIAGNOSIS — R42 DIZZINESS: ICD-10-CM

## 2021-11-02 DIAGNOSIS — R42 EPISODIC LIGHTHEADEDNESS: ICD-10-CM

## 2021-11-02 DIAGNOSIS — F41.9 ANXIETY: ICD-10-CM

## 2021-11-02 DIAGNOSIS — R00.2 PALPITATIONS: ICD-10-CM

## 2021-11-23 ENCOUNTER — HOSPITAL ENCOUNTER (OUTPATIENT)
Dept: NON INVASIVE DIAGNOSTICS | Age: 26
Discharge: HOME | End: 2021-11-23
Payer: COMMERCIAL

## 2021-11-23 DIAGNOSIS — R00.2 PALPITATIONS: ICD-10-CM

## 2021-11-23 DIAGNOSIS — R42 LIGHT HEADEDNESS: ICD-10-CM

## 2021-11-23 LAB
LV EF: 58 %
LVEF MODALITY: NORMAL

## 2021-11-23 PROCEDURE — 93306 TTE W/DOPPLER COMPLETE: CPT

## 2021-12-01 DIAGNOSIS — F90.0 ADHD (ATTENTION DEFICIT HYPERACTIVITY DISORDER), INATTENTIVE TYPE: ICD-10-CM

## 2021-12-07 ENCOUNTER — TELEPHONE (OUTPATIENT)
Dept: CARDIOLOGY CLINIC | Age: 26
End: 2021-12-07

## 2021-12-08 NOTE — TELEPHONE ENCOUNTER
Called to ask pt about current symptoms,. Unable to lvm    Dr. Reginald Ward note from 11/1    \"-If sx not improved with Zoloft and echo is normal (as I expect it to be), would ask her to see EP for poss Tilt testing. Will get appt and cancel if sx improve and echo ok. \"

## 2021-12-10 DIAGNOSIS — F90.0 ADHD (ATTENTION DEFICIT HYPERACTIVITY DISORDER), INATTENTIVE TYPE: ICD-10-CM

## 2021-12-10 NOTE — TELEPHONE ENCOUNTER
Spoke with pt. Echo was normal.  Pt states that her dose of Zoloft was increased about 2 weeks ago. Since then, her symptoms have improved, but not resolved. She will wait and see how she is doing the next several weeks. She already has an appointment with [facility] on 1/6/22 for possible tilt. If she feels better, she will call and cancel. If not, she will keep the appointment.

## 2021-12-13 RX ORDER — DEXTROAMPHETAMINE SACCHARATE, AMPHETAMINE ASPARTATE, DEXTROAMPHETAMINE SULFATE AND AMPHETAMINE SULFATE 1.25; 1.25; 1.25; 1.25 MG/1; MG/1; MG/1; MG/1
5 TABLET ORAL DAILY PRN
Qty: 30 TABLET | Refills: 0 | Status: SHIPPED | OUTPATIENT
Start: 2021-12-13 | End: 2022-02-01 | Stop reason: SDUPTHER

## 2021-12-13 NOTE — TELEPHONE ENCOUNTER
Medication:   Requested Prescriptions     Pending Prescriptions Disp Refills    amphetamine-dextroamphetamine (ADDERALL, 5MG,) 5 MG tablet 30 tablet 0     Sig: Take 1 tablet by mouth daily as needed (decreased focus/concentration) for up to 30 days. Last Filled:      Patient Phone Number: [phone] (home)     Last appt: 9/17/2021   Next appt: Visit date not found    Last OARRS:   RX Monitoring 12/2/2021   Periodic Controlled Substance Monitoring No signs of potential drug abuse or diversion identified.

## 2022-01-06 ENCOUNTER — OFFICE VISIT (OUTPATIENT)
Dept: CARDIOLOGY CLINIC | Age: 27
End: 2022-01-06
Payer: COMMERCIAL

## 2022-01-06 VITALS
HEART RATE: 68 BPM | BODY MASS INDEX: 26.17 KG/M2 | DIASTOLIC BLOOD PRESSURE: 71 MMHG | HEIGHT: 62 IN | WEIGHT: 142.2 LBS | SYSTOLIC BLOOD PRESSURE: 103 MMHG

## 2022-01-06 DIAGNOSIS — R00.2 PALPITATIONS: ICD-10-CM

## 2022-01-06 DIAGNOSIS — R42 LIGHT HEADEDNESS: Primary | ICD-10-CM

## 2022-01-06 PROCEDURE — 99204 OFFICE O/P NEW MOD 45 MIN: CPT | Performed by: INTERNAL MEDICINE

## 2022-01-06 PROCEDURE — 93000 ELECTROCARDIOGRAM COMPLETE: CPT | Performed by: INTERNAL MEDICINE

## 2022-01-06 NOTE — PATIENT INSTRUCTIONS
1. Drink a lot of water, about 140 oz per day    2. Stress reduction with meditation / yoga    3. Increase salt intake  Consume about 8025-2716 mg of sodium/day. ¨ 1 tsp of ordinary table salt= about 2300 mg of sodium, and the ordinary diet contains about 4000 mg of sodium. So as a rough estimate, you might be aiming for 1.5 to 2 more tsp of salt/day. Start slow and work up. ¨ There are online tools (such as myfitnesspal.com) that can help you track your sodium intake. 4  Counter pressure maneuvers:    Stand and place only the upper back against a wall (with ankles approximately 15 cm away from the wall) without moving. The sessions are initially performed in a quiet and comfortable environment (possibly under supervision of a family member). Stand still with upper back positioned lightly against a wall or a corner. A carpeted floor is preferred, and the nearby environment should be devoid of sharp-edged objects or other hazards should you fall. Initially I recommend 3 to 5 min of standing twice daily. Then, depending on symptom status, the duration can be slowly increased each week. The target is 20 to 30 min twice daily without symptoms. Thereafter, 20 min sessions 3 to 4 times/week are recommended indefinitely. Patient education materials:          Watch these videos in [employer]. com    http://silvia-mannie.info/    https://www.youRevinate.com/watch?v=[email or web address]

## 2022-01-18 ENCOUNTER — E-VISIT (OUTPATIENT)
Dept: PRIMARY CARE CLINIC | Age: 27
End: 2022-01-18
Payer: COMMERCIAL

## 2022-01-18 DIAGNOSIS — F90.0 ADHD (ATTENTION DEFICIT HYPERACTIVITY DISORDER), INATTENTIVE TYPE: ICD-10-CM

## 2022-01-18 PROCEDURE — 99421 OL DIG E/M SVC 5-10 MIN: CPT | Performed by: FAMILY MEDICINE

## 2022-01-18 NOTE — TELEPHONE ENCOUNTER
Medication:   Requested Prescriptions     Pending Prescriptions Disp Refills    lisdexamfetamine (VYVANSE) 30 MG capsule 30 capsule 0     Sig: Take 1 capsule by mouth every morning for 30 days. Last Filled:      Patient Phone Number: [phone] (home)     Last appt: 9/17/2021   Next appt: Visit date not found    Last OARRS:   RX Monitoring 12/13/2021   Periodic Controlled Substance Monitoring No signs of potential drug abuse or diversion identified.

## 2022-01-19 NOTE — PROGRESS NOTES
Reviewed E-visit questionnaire. It looks like patient is trying to restart her ADHD medications and needs refills. The last actual ADHD follow-up I had with the patient was on 2/15/2021. Since she stopped her medications months later due to palpitations and feeling lightheaded, she has had work-up with neurology and cardiology and electrophysiology. Work-up was mostly unremarkable stating that she should increase her salt intake and work on maneuvers to increase blood pressure when she feels lightheaded. She will need to make an office visit in order to gauge where her blood pressure is at before restarting her stimulants. Per questionnaire, patient is asking to restart Vyvanse 30 mg daily and Adderall 5 mg daily as needed. 5 to 10 minutes to review questionnaire and chart in order to formulate plan.     Janki Colbert MD  1/19/2022  8:25 AM

## 2022-01-20 ENCOUNTER — OFFICE VISIT (OUTPATIENT)
Dept: PRIMARY CARE CLINIC | Age: 27
End: 2022-01-20
Payer: COMMERCIAL

## 2022-01-20 VITALS
TEMPERATURE: 98.2 F | SYSTOLIC BLOOD PRESSURE: 113 MMHG | HEART RATE: 88 BPM | DIASTOLIC BLOOD PRESSURE: 75 MMHG | BODY MASS INDEX: 26.56 KG/M2 | WEIGHT: 145.2 LBS | RESPIRATION RATE: 16 BRPM

## 2022-01-20 DIAGNOSIS — F90.0 ADHD (ATTENTION DEFICIT HYPERACTIVITY DISORDER), INATTENTIVE TYPE: Primary | ICD-10-CM

## 2022-01-20 DIAGNOSIS — F41.9 ANXIETY: ICD-10-CM

## 2022-01-20 PROCEDURE — 99214 OFFICE O/P EST MOD 30 MIN: CPT | Performed by: FAMILY MEDICINE

## 2022-01-20 ASSESSMENT — ENCOUNTER SYMPTOMS
EYE PAIN: 0
CONSTIPATION: 0
ABDOMINAL PAIN: 0
SHORTNESS OF BREATH: 0
DIARRHEA: 0
COUGH: 0

## 2022-01-20 NOTE — PROGRESS NOTES
Chief Complaint   Patient presents with    ADHD         ASSESSMENT/PLAN:  1. ADHD (attention deficit hyperactivity disorder), inattentive type  PDMP reviewed  Follow up in 3 months  No suspicious activity  Stable, BP good  - lisdexamfetamine (VYVANSE) 30 MG capsule; Take 1 capsule by mouth every morning for 30 days. Dispense: 30 capsule; Refill: 0    2. Anxiety  Stable and doing well on this  Discussed taper in the future  All questions answered. HPI:  Krystal Rose is a 27 y.o. (: 1995) here today for ADHD check. Current Meds:  Vyvanse 30 mg daily and Adderall 5 mg daily PRN   -Takes 3-4 days/week for vyvanse, 2-3 days per week for adderall    Sleeping: No problems reported  Eating: No reported changes in appetite. Weight Loss/Gain: No concerns reported. Side effects to the medication: Denied CP, Palpitations, SOB, Changes in bowel habits.  working from home. Doing well. Are there any new stressors affecting Karen?: no    If there is counseling or mental health involvement, is Krystal Rose attending sessions? no      Anxiety  Doing well on zoloft 50 mg daily  She is asking about tapering and when to know she can stop. No new stressors  No therapy on board. No palpitations or lightheadedness      Review of Systems   Constitutional: Negative for appetite change, chills, fatigue and fever. HENT: Negative for congestion. Eyes: Negative for pain and visual disturbance. Respiratory: Negative for cough and shortness of breath. Cardiovascular: Negative for chest pain and palpitations. Gastrointestinal: Negative for abdominal pain, constipation and diarrhea. Genitourinary: Negative for difficulty urinating. Musculoskeletal: Negative for arthralgias. Skin: Negative for rash and wound. Neurological: Negative for dizziness, weakness, light-headedness and headaches. Hematological: Does not bruise/bleed easily. Psychiatric/Behavioral: Negative for behavioral problems. History reviewed. No pertinent past medical history. Family History   Problem Relation Age of Onset    Hypertension Mother     No Known Problems Father     No Known Problems Brother     No Known Problems Brother        Social History     Tobacco Use    Smoking status: Never Smoker    Smokeless tobacco: Never Used   Vaping Use    Vaping Use: Never used   Substance Use Topics    Alcohol use: Yes     Comment: Socially    Drug use: Never       New Prescriptions    No medications on file       Meds Prior to visit:  Current Outpatient Medications on File Prior to Visit   Medication Sig Dispense Refill    sertraline (ZOLOFT) 50 MG tablet Take 1 tablet by mouth daily 90 tablet 3    LILLOW 0.15-30 MG-MCG per tablet       amphetamine-dextroamphetamine (ADDERALL, 5MG,) 5 MG tablet Take 1 tablet by mouth daily as needed (decreased focus/concentration) for up to 30 days. 30 tablet 0     No current facility-administered medications on file prior to visit. Allergies   Allergen Reactions    Amoxicillin Hives     Unknown patient states she has been tested for that when she was younger         OBJECTIVE:  /75   Pulse 88   Temp 98.2 °F (36.8 °C) (Temporal)   Resp 16   Wt 145 lb 3.2 oz (65.9 kg)   LMP 12/30/2021   BMI 26.56 kg/m²   BP Readings from Last 2 Encounters:   01/20/22 113/75   01/06/22 103/71     Wt Readings from Last 3 Encounters:   01/20/22 145 lb 3.2 oz (65.9 kg)   01/06/22 142 lb 3.2 oz (64.5 kg)   11/01/21 148 lb 12.8 oz (67.5 kg)       Physical Exam  Vitals reviewed. Constitutional:       General: She is not in acute distress. Appearance: Normal appearance. She is not ill-appearing. HENT:      Head: Normocephalic and atraumatic. Right Ear: External ear normal.      Left Ear: External ear normal.      Nose: Nose normal. No congestion. Mouth/Throat:      Mouth: Mucous membranes are moist.      Pharynx: Oropharynx is clear. No oropharyngeal exudate.    Eyes: Extraocular Movements: Extraocular movements intact. Conjunctiva/sclera: Conjunctivae normal.      Pupils: Pupils are equal, round, and reactive to light. Cardiovascular:      Rate and Rhythm: Normal rate and regular rhythm. Pulses: Normal pulses. Heart sounds: Normal heart sounds. Pulmonary:      Effort: Pulmonary effort is normal. No respiratory distress. Breath sounds: Normal breath sounds. No stridor. No wheezing, rhonchi or rales. Abdominal:      General: Bowel sounds are normal.      Palpations: Abdomen is soft. Tenderness: There is no abdominal tenderness. Musculoskeletal:         General: Normal range of motion. Cervical back: Normal range of motion and neck supple. Right lower leg: No edema. Left lower leg: No edema. Skin:     General: Skin is warm. Capillary Refill: Capillary refill takes less than 2 seconds. Findings: No erythema or rash. Neurological:      General: No focal deficit present. Mental Status: She is alert and oriented to person, place, and time. Mental status is at baseline. Motor: No weakness. Coordination: Coordination normal.      Gait: Gait normal.   Psychiatric:         Mood and Affect: Mood normal.         Behavior: Behavior normal.         Thought Content:  Thought content normal.         Judgment: Judgment normal.         Lab Results   Component Value Date    WBC 5.8 03/30/2021    HGB 13.8 03/30/2021    HCT 40.1 03/30/2021    MCV 89.7 03/30/2021     03/30/2021     Lab Results   Component Value Date     02/21/2020    K 4.8 02/21/2020     02/21/2020    CO2 21 02/21/2020    BUN 9 02/21/2020    CREATININE 0.6 02/21/2020    GLUCOSE 64 (L) 02/21/2020    CALCIUM 9.7 02/21/2020    PROT 7.2 02/21/2020    LABALBU 4.5 02/21/2020    BILITOT 0.4 02/21/2020    ALKPHOS 40 02/21/2020    AST 57 (H) 02/21/2020    ALT 40 02/21/2020    LABGLOM >60 02/21/2020    GFRAA >60 02/21/2020    AGRATIO 1.7 02/21/2020 GLOB 2.7 02/21/2020     Lab Results   Component Value Date    CHOL 203 (H) 02/21/2020     Lab Results   Component Value Date    TRIG 94 02/21/2020     Lab Results   Component Value Date    HDL 50 02/21/2020     Lab Results   Component Value Date    LDLCALC 134 (H) 02/21/2020     Lab Results   Component Value Date    LABVLDL 19 02/21/2020     No results found for: LABA1C      Discussed use, benefit, and side effects of prescribed medications. Barriers to medication compliance addressed. All patient questions answered. Pt voiced understanding. RTC Return in about 3 months (around 4/20/2022) for VV. No future appointments.     Mecca Jensen MD  1/20/2022  9:47 AM

## 2022-02-01 DIAGNOSIS — F90.0 ADHD (ATTENTION DEFICIT HYPERACTIVITY DISORDER), INATTENTIVE TYPE: ICD-10-CM

## 2022-02-01 RX ORDER — DEXTROAMPHETAMINE SACCHARATE, AMPHETAMINE ASPARTATE, DEXTROAMPHETAMINE SULFATE AND AMPHETAMINE SULFATE 1.25; 1.25; 1.25; 1.25 MG/1; MG/1; MG/1; MG/1
5 TABLET ORAL DAILY PRN
Qty: 30 TABLET | Refills: 0 | Status: SHIPPED | OUTPATIENT
Start: 2022-02-01 | End: 2022-03-01 | Stop reason: SDUPTHER

## 2022-02-01 NOTE — TELEPHONE ENCOUNTER
Medication:   Requested Prescriptions     Pending Prescriptions Disp Refills    amphetamine-dextroamphetamine (ADDERALL, 5MG,) 5 MG tablet 30 tablet 0     Sig: Take 1 tablet by mouth daily as needed (decreased focus/concentration) for up to 30 days. Last Filled:      Patient Phone Number: [phone] (home)     Last appt: 1/20/2022   Next appt: Visit date not found    Last OARRS:   RX Monitoring 1/20/2022   Periodic Controlled Substance Monitoring No signs of potential drug abuse or diversion identified.

## 2022-02-17 DIAGNOSIS — F90.0 ADHD (ATTENTION DEFICIT HYPERACTIVITY DISORDER), INATTENTIVE TYPE: ICD-10-CM

## 2022-02-17 NOTE — TELEPHONE ENCOUNTER
Medication:   Requested Prescriptions     Pending Prescriptions Disp Refills    lisdexamfetamine (VYVANSE) 30 MG capsule 30 capsule 0     Sig: Take 1 capsule by mouth every morning for 30 days. Last Filled:      Patient Phone Number: [phone] (home)     Last appt: 1/20/2022   Next appt: Visit date not found    Last OARRS:   RX Monitoring 2/1/2022   Periodic Controlled Substance Monitoring No signs of potential drug abuse or diversion identified.

## 2022-03-01 DIAGNOSIS — F90.0 ADHD (ATTENTION DEFICIT HYPERACTIVITY DISORDER), INATTENTIVE TYPE: ICD-10-CM

## 2022-03-02 RX ORDER — DEXTROAMPHETAMINE SACCHARATE, AMPHETAMINE ASPARTATE, DEXTROAMPHETAMINE SULFATE AND AMPHETAMINE SULFATE 1.25; 1.25; 1.25; 1.25 MG/1; MG/1; MG/1; MG/1
5 TABLET ORAL DAILY PRN
Qty: 30 TABLET | Refills: 0 | Status: SHIPPED | OUTPATIENT
Start: 2022-03-02 | End: 2022-04-14 | Stop reason: SDUPTHER

## 2022-03-02 NOTE — TELEPHONE ENCOUNTER
Medication:   Requested Prescriptions     Pending Prescriptions Disp Refills    amphetamine-dextroamphetamine (ADDERALL, 5MG,) 5 MG tablet 30 tablet 0     Sig: Take 1 tablet by mouth daily as needed (decreased focus/concentration) for up to 30 days. Last Filled:      Patient Phone Number: [phone] (home)     Last appt: 1/20/2022   Next appt: Visit date not found    Last OARRS:   RX Monitoring 2/17/2022   Periodic Controlled Substance Monitoring No signs of potential drug abuse or diversion identified.

## 2022-03-14 DIAGNOSIS — F41.9 ANXIETY: ICD-10-CM

## 2022-03-14 DIAGNOSIS — R42 EPISODIC LIGHTHEADEDNESS: ICD-10-CM

## 2022-03-14 DIAGNOSIS — R42 DIZZINESS: ICD-10-CM

## 2022-03-14 DIAGNOSIS — R00.2 PALPITATIONS: ICD-10-CM

## 2022-03-14 RX ORDER — SERTRALINE HYDROCHLORIDE 25 MG/1
TABLET, FILM COATED ORAL
Qty: 90 TABLET | Refills: 1 | OUTPATIENT
Start: 2022-03-14

## 2022-03-14 NOTE — TELEPHONE ENCOUNTER
Medication:   Requested Prescriptions     Pending Prescriptions Disp Refills    sertraline (ZOLOFT) 25 MG tablet [Pharmacy Med Name: SERTRALINE HCL 25 MG TABLET] 90 tablet 1     Sig: TAKE 1 TABLET BY MOUTH EVERY DAY        Last Filled:      Patient Phone Number: [phone] (home)     Last appt: 1/20/2022   Next appt: Visit date not found    Last OARRS:   RX Monitoring 2/17/2022   Periodic Controlled Substance Monitoring No signs of potential drug abuse or diversion identified.

## 2022-03-23 DIAGNOSIS — F90.0 ADHD (ATTENTION DEFICIT HYPERACTIVITY DISORDER), INATTENTIVE TYPE: ICD-10-CM

## 2022-03-28 ENCOUNTER — PATIENT MESSAGE (OUTPATIENT)
Dept: PRIMARY CARE CLINIC | Age: 27
End: 2022-03-28

## 2022-03-28 DIAGNOSIS — F90.0 ADHD (ATTENTION DEFICIT HYPERACTIVITY DISORDER), INATTENTIVE TYPE: ICD-10-CM

## 2022-03-28 NOTE — TELEPHONE ENCOUNTER
From: Gavi Rodriguez  To: Dr. Mario Rivera: 3/28/2022 9:41 AM EDT  Subject: Unable to refill prescription    Hello,    I've sent a few messages about this already, but I am trying to renew my Vyvanse prescription still. I've been out for a few days now and have sent several messages. I last filled my prescription on 2/17, and have not received a response to my renewal request.    I tried calling the office several times but there is no answer. Could someone please call me at [phone] and let me know if there is an issue with my refill?     Thank you,  Nicolas Pretty

## 2022-03-29 DIAGNOSIS — F90.0 ADHD (ATTENTION DEFICIT HYPERACTIVITY DISORDER), INATTENTIVE TYPE: ICD-10-CM

## 2022-04-14 DIAGNOSIS — F90.0 ADHD (ATTENTION DEFICIT HYPERACTIVITY DISORDER), INATTENTIVE TYPE: ICD-10-CM

## 2022-04-14 RX ORDER — DEXTROAMPHETAMINE SACCHARATE, AMPHETAMINE ASPARTATE, DEXTROAMPHETAMINE SULFATE AND AMPHETAMINE SULFATE 1.25; 1.25; 1.25; 1.25 MG/1; MG/1; MG/1; MG/1
5 TABLET ORAL DAILY PRN
Qty: 30 TABLET | Refills: 0 | Status: SHIPPED | OUTPATIENT
Start: 2022-04-14 | End: 2022-06-02 | Stop reason: SDUPTHER

## 2022-05-03 DIAGNOSIS — F90.0 ADHD (ATTENTION DEFICIT HYPERACTIVITY DISORDER), INATTENTIVE TYPE: ICD-10-CM

## 2022-05-03 NOTE — TELEPHONE ENCOUNTER
Medication:   Requested Prescriptions     Pending Prescriptions Disp Refills    lisdexamfetamine (VYVANSE) 30 MG capsule 30 capsule 0     Sig: Take 1 capsule by mouth every morning for 30 days. Last Filled:  03/29/22    Patient Phone Number: [phone] (home)     Last appt: 1/20/2022 Return in about 3 months (around 4/20/2022) for VV. Next appt: Visit date not found    Last OARRS:   RX Monitoring 2/17/2022   Periodic Controlled Substance Monitoring No signs of potential drug abuse or diversion identified.

## 2022-05-31 DIAGNOSIS — F90.0 ADHD (ATTENTION DEFICIT HYPERACTIVITY DISORDER), INATTENTIVE TYPE: ICD-10-CM

## 2022-05-31 RX ORDER — DEXTROAMPHETAMINE SACCHARATE, AMPHETAMINE ASPARTATE, DEXTROAMPHETAMINE SULFATE AND AMPHETAMINE SULFATE 1.25; 1.25; 1.25; 1.25 MG/1; MG/1; MG/1; MG/1
5 TABLET ORAL DAILY PRN
Qty: 30 TABLET | Refills: 0 | OUTPATIENT
Start: 2022-05-31 | End: 2022-06-30

## 2022-05-31 NOTE — TELEPHONE ENCOUNTER
Medication:   Requested Prescriptions     Pending Prescriptions Disp Refills    amphetamine-dextroamphetamine (ADDERALL, 5MG,) 5 MG tablet 30 tablet 0     Sig: Take 1 tablet by mouth daily as needed (decreased focus/concentration) for up to 30 days. Last Filled:  04/14/22    Patient Phone Number: [phone] (home)     Last appt: 1/20/2022 Return in about 3 months (around 4/20/2022) for VV. Next appt: Visit date not found    Last OARRS:   RX Monitoring 2/17/2022   Periodic Controlled Substance Monitoring No signs of potential drug abuse or diversion identified.

## 2022-06-02 ENCOUNTER — TELEMEDICINE (OUTPATIENT)
Dept: PRIMARY CARE CLINIC | Age: 27
End: 2022-06-02
Payer: COMMERCIAL

## 2022-06-02 DIAGNOSIS — F90.0 ADHD (ATTENTION DEFICIT HYPERACTIVITY DISORDER), INATTENTIVE TYPE: Primary | ICD-10-CM

## 2022-06-02 PROCEDURE — 99214 OFFICE O/P EST MOD 30 MIN: CPT | Performed by: FAMILY MEDICINE

## 2022-06-02 RX ORDER — DEXTROAMPHETAMINE SACCHARATE, AMPHETAMINE ASPARTATE, DEXTROAMPHETAMINE SULFATE AND AMPHETAMINE SULFATE 1.25; 1.25; 1.25; 1.25 MG/1; MG/1; MG/1; MG/1
5 TABLET ORAL DAILY PRN
Qty: 30 TABLET | Refills: 0 | Status: SHIPPED | OUTPATIENT
Start: 2022-06-02 | End: 2022-07-05 | Stop reason: SDUPTHER

## 2022-06-02 ASSESSMENT — PATIENT HEALTH QUESTIONNAIRE - PHQ9
SUM OF ALL RESPONSES TO PHQ QUESTIONS 1-9: 0
2. FEELING DOWN, DEPRESSED OR HOPELESS: 0
SUM OF ALL RESPONSES TO PHQ QUESTIONS 1-9: 0
SUM OF ALL RESPONSES TO PHQ QUESTIONS 1-9: 0
SUM OF ALL RESPONSES TO PHQ9 QUESTIONS 1 & 2: 0
1. LITTLE INTEREST OR PLEASURE IN DOING THINGS: 0
SUM OF ALL RESPONSES TO PHQ QUESTIONS 1-9: 0

## 2022-06-02 ASSESSMENT — ENCOUNTER SYMPTOMS
CONSTIPATION: 0
DIARRHEA: 0
ABDOMINAL PAIN: 0
SHORTNESS OF BREATH: 0
EYE PAIN: 0
COUGH: 0

## 2022-06-02 NOTE — PROGRESS NOTES
2022    TELEHEALTH EVALUATION -- Audio/Visual (During ZZMOF-07 public health emergency)    HPI:    Vannessa Lin (:  1995) has requested an audio/video evaluation for the following concern(s):    Current Meds:  Vyvanse 30 mg daily and Adderall 5 mg daily PRN              -Takes 5-7 days/week for vyvanse, 2-3 days per week for adderall     Sleeping: No problems reported  Eating: No reported changes in appetite. Weight Loss/Gain: No concerns reported. Side effects to the medication: Denied CP, Palpitations, SOB, Changes in bowel habits.  working from home. Doing well.      Are there any new stressors affecting Karen?: no     If there is counseling or mental health involvement, is Bubba Rhoda attending sessions? no       Review of Systems   Constitutional: Negative for appetite change, chills, fatigue and fever. HENT: Negative for congestion. Eyes: Negative for pain and visual disturbance. Respiratory: Negative for cough and shortness of breath. Cardiovascular: Negative for chest pain and palpitations. Gastrointestinal: Negative for abdominal pain, constipation and diarrhea. Genitourinary: Negative for difficulty urinating. Musculoskeletal: Negative for arthralgias. Skin: Negative for rash and wound. Neurological: Negative for dizziness, weakness, light-headedness and headaches. Hematological: Does not bruise/bleed easily. Psychiatric/Behavioral: Negative for behavioral problems. Prior to Visit Medications    Medication Sig Taking? Authorizing Provider   amphetamine-dextroamphetamine (ADDERALL, 5MG,) 5 MG tablet Take 1 tablet by mouth daily as needed (decreased focus/concentration) for up to 30 days. Yes Jaydon Watkins MD   lisdexamfetamine (VYVANSE) 30 MG capsule Take 1 capsule by mouth every morning for 30 days.  Yes Jaydon Watkins MD   LILLOW 0.15-30 MG-MCG per tablet  Yes Historical Provider, MD       Social History     Tobacco Use    Smoking status: Never Smoker    Smokeless tobacco: Never Used   Vaping Use    Vaping Use: Never used   Substance Use Topics    Alcohol use: Yes     Comment: Socially    Drug use: Never        Allergies   Allergen Reactions    Amoxicillin Hives     Unknown patient states she has been tested for that when she was younger     , History reviewed. No pertinent past medical history. , History reviewed. No pertinent surgical history. ,   Social History     Tobacco Use    Smoking status: Never Smoker    Smokeless tobacco: Never Used   Vaping Use    Vaping Use: Never used   Substance Use Topics    Alcohol use: Yes     Comment: Socially    Drug use: Never   ,   Family History   Problem Relation Age of Onset    Hypertension Mother     No Known Problems Father     No Known Problems Brother     No Known Problems Brother    ,   Immunization History   Administered Date(s) Administered    COVID-19, Pfizer Purple top, DILUTE for use, 12+ yrs, 30mcg/0.3mL dose 04/01/2021, 04/01/2021, 04/25/2021    DTP 1995, 10/28/1998    DTP/HiB 1993, 02/04/1994    DTaP (Infanrix) 04/24/1995    HPV Quadrivalent (Gardasil) 01/04/2006, 10/31/2006, 08/22/2007    Hepatitis A Ped/Adol (Havrix, Vaqta) 08/22/2007, 06/12/2009    Hepatitis B 1995, 02/04/1994, 07/08/1994    Hib vaccine 1995, 02/04/1994, 04/08/1994, 10/17/1994    MMR 02/13/1995, 10/28/1998    Meningococcal MCV4P (Menactra) 08/22/2007, 08/09/2012    Polio OPV 1995, 02/04/1994, 04/24/1995, 10/28/1998    Tdap (Boostrix, Adacel) 10/31/2006    Varicella (Varivax) 11/30/1995, 10/31/2006   ,   Health Maintenance   Topic Date Due    HIV screen  Never done    Hepatitis C screen  Never done    Pap smear  Never done    DTaP/Tdap/Td vaccine (6 - Td or Tdap) 10/31/2016    COVID-19 Vaccine (3 - Booster for Pfizer series) 09/25/2021    Depression Screen  03/30/2022    Flu vaccine (Season Ended) 09/01/2022    Hepatitis A vaccine  Completed    Hepatitis B vaccine Completed    Hib vaccine  Completed    Varicella vaccine  Completed    Meningococcal (ACWY) vaccine  Completed    Pneumococcal 0-64 years Vaccine  Aged Out       PHYSICAL EXAMINATION:  [ INSTRUCTIONS:  \"[x]\" Indicates a positive item  \"[]\" Indicates a negative item  -- DELETE ALL ITEMS NOT EXAMINED]  [x] Alert  [x] Oriented to person/place/time    [x] No apparent distress  [] Toxic appearing    [] Face flushed appearing [x] Sclera clear  [] Lips are cyanotic      [x] Breathing appears normal  [] Appears tachypneic      [] Rash on visible skin    [x] Cranial Nerves II-XII grossly intact    [] Motor grossly intact in visible upper extremities    [] Motor grossly intact in visible lower extremities    [x] Normal Mood  [] Anxious appearing    [] Depressed appearing  [] Confused appearing      Due to this being a TeleHealth encounter, evaluation of the following organ systems is limited: Vitals/Constitutional/EENT/Resp/CV/GI//MS/Neuro/Skin/Heme-Lymph-Imm. ASSESSMENT/PLAN:  1. ADHD (attention deficit hyperactivity disorder), inattentive type  PDMP reviewed  Follow up in 3 months to continue to monitor controlled substances  No suspicious activity  Stable on this regimen. - amphetamine-dextroamphetamine (ADDERALL, 5MG,) 5 MG tablet; Take 1 tablet by mouth daily as needed (decreased focus/concentration) for up to 30 days. Dispense: 30 tablet; Refill: 0  - lisdexamfetamine (VYVANSE) 30 MG capsule; Take 1 capsule by mouth every morning for 30 days. Dispense: 30 capsule; Refill: 0      No follow-ups on file. Future Appointments   Date Time Provider Willi Ponce   6/2/2022  9:30 AM Bertha Galeas MD [facility]       An  electronic signature was used to authenticate this note. --Bertha Galeas MD on 6/2/2022 at 9:29 AM    {Coding Help -- Use CPT 52931-25994 with EM elements or Time rules for Office Visits. :    >20 minutes were spent on the digital evaluation and management of this patient. Pursuant to the emergency declaration under the [address] [place] [address] waiver authority and the [employer] Act, this Virtual  Visit was conducted, with patient's consent, to reduce the patient's risk of exposure to COVID-19 and provide continuity of care for an established patient. Services were provided through a video synchronous discussion virtually to substitute for in-person clinic visit.

## 2022-07-05 DIAGNOSIS — F90.0 ADHD (ATTENTION DEFICIT HYPERACTIVITY DISORDER), INATTENTIVE TYPE: ICD-10-CM

## 2022-07-05 RX ORDER — DEXTROAMPHETAMINE SACCHARATE, AMPHETAMINE ASPARTATE, DEXTROAMPHETAMINE SULFATE AND AMPHETAMINE SULFATE 1.25; 1.25; 1.25; 1.25 MG/1; MG/1; MG/1; MG/1
5 TABLET ORAL DAILY PRN
Qty: 30 TABLET | Refills: 0 | Status: SHIPPED | OUTPATIENT
Start: 2022-07-05 | End: 2022-08-09 | Stop reason: SDUPTHER

## 2022-07-05 NOTE — TELEPHONE ENCOUNTER
Medication:   Requested Prescriptions     Pending Prescriptions Disp Refills    amphetamine-dextroamphetamine (ADDERALL, 5MG,) 5 MG tablet 30 tablet 0     Sig: Take 1 tablet by mouth daily as needed (decreased focus/concentration) for up to 30 days.  lisdexamfetamine (VYVANSE) 30 MG capsule 30 capsule 0     Sig: Take 1 capsule by mouth every morning for 30 days. Last Filled:      Patient Phone Number: [phone] (home)     Last appt: 6/2/2022   Next appt: Visit date not found    Last OARRS:   RX Monitoring 2/17/2022   Periodic Controlled Substance Monitoring No signs of potential drug abuse or diversion identified.

## 2022-08-09 DIAGNOSIS — F90.0 ADHD (ATTENTION DEFICIT HYPERACTIVITY DISORDER), INATTENTIVE TYPE: ICD-10-CM

## 2022-08-10 RX ORDER — DEXTROAMPHETAMINE SACCHARATE, AMPHETAMINE ASPARTATE, DEXTROAMPHETAMINE SULFATE AND AMPHETAMINE SULFATE 1.25; 1.25; 1.25; 1.25 MG/1; MG/1; MG/1; MG/1
5 TABLET ORAL DAILY PRN
Qty: 30 TABLET | Refills: 0 | Status: SHIPPED | OUTPATIENT
Start: 2022-08-10 | End: 2022-09-19 | Stop reason: SDUPTHER

## 2022-09-12 ENCOUNTER — E-VISIT (OUTPATIENT)
Dept: PRIMARY CARE CLINIC | Age: 27
End: 2022-09-12
Payer: COMMERCIAL

## 2022-09-12 DIAGNOSIS — F90.0 ADHD (ATTENTION DEFICIT HYPERACTIVITY DISORDER), INATTENTIVE TYPE: Primary | ICD-10-CM

## 2022-09-12 PROCEDURE — 99421 OL DIG E/M SVC 5-10 MIN: CPT | Performed by: FAMILY MEDICINE

## 2022-09-12 NOTE — PROGRESS NOTES
Reviewed E-visit questionnaire and chart. Current Meds:  vyvanse 30 mg daily and adderall IR 5 mg daily PRN    Patient needs to schedule a virtual visit or office visit for refill of controlled substances. Last office visit 6/2/2022    1. ADHD (attention deficit hyperactivity disorder), inattentive type    Spent 5-10 min reviewing questionnaire, chart and formulating plan.      Latisha Montemayor MD  9/12/2022  4:53 PM

## 2022-09-19 DIAGNOSIS — F90.0 ADHD (ATTENTION DEFICIT HYPERACTIVITY DISORDER), INATTENTIVE TYPE: ICD-10-CM

## 2022-09-19 RX ORDER — DEXTROAMPHETAMINE SACCHARATE, AMPHETAMINE ASPARTATE, DEXTROAMPHETAMINE SULFATE AND AMPHETAMINE SULFATE 1.25; 1.25; 1.25; 1.25 MG/1; MG/1; MG/1; MG/1
5 TABLET ORAL DAILY PRN
Qty: 30 TABLET | Refills: 0 | Status: SHIPPED | OUTPATIENT
Start: 2022-09-19 | End: 2022-10-25 | Stop reason: SDUPTHER

## 2022-09-19 NOTE — TELEPHONE ENCOUNTER
Medication:   Requested Prescriptions     Pending Prescriptions Disp Refills    amphetamine-dextroamphetamine (ADDERALL, 5MG,) 5 MG tablet 30 tablet 0     Sig: Take 1 tablet by mouth daily as needed (decreased focus/concentration) for up to 30 days. lisdexamfetamine (VYVANSE) 30 MG capsule 30 capsule 0     Sig: Take 1 capsule by mouth every morning for 30 days. Last Filled:      Patient Phone Number: [phone] (home)     Last appt: 9/12/2022   Next appt: Visit date not found    Last OARRS:   RX Monitoring 2/17/2022   Periodic Controlled Substance Monitoring No signs of potential drug abuse or diversion identified.

## 2022-10-25 DIAGNOSIS — F90.0 ADHD (ATTENTION DEFICIT HYPERACTIVITY DISORDER), INATTENTIVE TYPE: ICD-10-CM

## 2022-10-25 RX ORDER — DEXTROAMPHETAMINE SACCHARATE, AMPHETAMINE ASPARTATE, DEXTROAMPHETAMINE SULFATE AND AMPHETAMINE SULFATE 1.25; 1.25; 1.25; 1.25 MG/1; MG/1; MG/1; MG/1
5 TABLET ORAL DAILY PRN
Qty: 30 TABLET | Refills: 0 | Status: SHIPPED | OUTPATIENT
Start: 2022-10-25 | End: 2022-11-29 | Stop reason: SDUPTHER

## 2022-10-25 NOTE — TELEPHONE ENCOUNTER
Medication:   Requested Prescriptions     Pending Prescriptions Disp Refills    amphetamine-dextroamphetamine (ADDERALL, 5MG,) 5 MG tablet 30 tablet 0     Sig: Take 1 tablet by mouth daily as needed (decreased focus/concentration) for up to 30 days. lisdexamfetamine (VYVANSE) 30 MG capsule 30 capsule 0     Sig: Take 1 capsule by mouth every morning for 30 days. Last Filled:  9/19/22    Patient Phone Number: [phone] (home)     Last appt: 9/12/2022   Next appt: Visit date not found    Last OARRS:   RX Monitoring 2/17/2022   Periodic Controlled Substance Monitoring No signs of potential drug abuse or diversion identified.

## 2022-11-29 DIAGNOSIS — F90.0 ADHD (ATTENTION DEFICIT HYPERACTIVITY DISORDER), INATTENTIVE TYPE: ICD-10-CM

## 2022-11-29 RX ORDER — DEXTROAMPHETAMINE SACCHARATE, AMPHETAMINE ASPARTATE, DEXTROAMPHETAMINE SULFATE AND AMPHETAMINE SULFATE 1.25; 1.25; 1.25; 1.25 MG/1; MG/1; MG/1; MG/1
5 TABLET ORAL DAILY PRN
Qty: 30 TABLET | Refills: 0 | Status: SHIPPED | OUTPATIENT
Start: 2022-11-29 | End: 2022-12-29

## 2022-11-29 NOTE — TELEPHONE ENCOUNTER
Medication:   Requested Prescriptions     Pending Prescriptions Disp Refills    amphetamine-dextroamphetamine (ADDERALL, 5MG,) 5 MG tablet 30 tablet 0     Sig: Take 1 tablet by mouth daily as needed (decreased focus/concentration) for up to 30 days. lisdexamfetamine (VYVANSE) 30 MG capsule 30 capsule 0     Sig: Take 1 capsule by mouth every morning for 30 days. Last Filled:  10/25/22    Patient Phone Number: [phone] (home)     Last appt: 9/12/2022   Next appt: Visit date not found    Last OARRS:   RX Monitoring 2/17/2022   Periodic Controlled Substance Monitoring No signs of potential drug abuse or diversion identified.

## 2023-01-11 DIAGNOSIS — F90.0 ADHD (ATTENTION DEFICIT HYPERACTIVITY DISORDER), INATTENTIVE TYPE: ICD-10-CM

## 2023-01-11 RX ORDER — DEXTROAMPHETAMINE SACCHARATE, AMPHETAMINE ASPARTATE, DEXTROAMPHETAMINE SULFATE AND AMPHETAMINE SULFATE 1.25; 1.25; 1.25; 1.25 MG/1; MG/1; MG/1; MG/1
5 TABLET ORAL DAILY PRN
Qty: 30 TABLET | Refills: 0 | OUTPATIENT
Start: 2023-01-11 | End: 2023-02-10

## 2023-01-11 NOTE — TELEPHONE ENCOUNTER
Medication:   Requested Prescriptions     Pending Prescriptions Disp Refills    amphetamine-dextroamphetamine (ADDERALL, 5MG,) 5 MG tablet 30 tablet 0     Sig: Take 1 tablet by mouth daily as needed (decreased focus/concentration) for up to 30 days.    lisdexamfetamine (VYVANSE) 30 MG capsule 30 capsule 0     Sig: Take 1 capsule by mouth every morning for 30 days.        Last Filled:  11/29/22    Patient Phone Number: [phone] (home)     Last appt: 9/12/2022   Return in about 3 months (around 9/2/2022  Next appt: Visit date not found    Last OARRS:   RX Monitoring 2/17/2022   Periodic Controlled Substance Monitoring No signs of potential drug abuse or diversion identified.

## 2023-01-16 ASSESSMENT — ENCOUNTER SYMPTOMS
EYE PAIN: 0
ABDOMINAL PAIN: 0
SHORTNESS OF BREATH: 0
CONSTIPATION: 0
DIARRHEA: 0
COUGH: 0

## 2023-01-17 ENCOUNTER — TELEMEDICINE (OUTPATIENT)
Dept: PRIMARY CARE CLINIC | Age: 28
End: 2023-01-17
Payer: COMMERCIAL

## 2023-01-17 DIAGNOSIS — F90.0 ADHD (ATTENTION DEFICIT HYPERACTIVITY DISORDER), INATTENTIVE TYPE: Primary | ICD-10-CM

## 2023-01-17 PROCEDURE — 99214 OFFICE O/P EST MOD 30 MIN: CPT | Performed by: FAMILY MEDICINE

## 2023-01-17 RX ORDER — DEXTROAMPHETAMINE SACCHARATE, AMPHETAMINE ASPARTATE, DEXTROAMPHETAMINE SULFATE AND AMPHETAMINE SULFATE 1.25; 1.25; 1.25; 1.25 MG/1; MG/1; MG/1; MG/1
5 TABLET ORAL DAILY PRN
Qty: 30 TABLET | Refills: 0 | Status: SHIPPED | OUTPATIENT
Start: 2023-01-17 | End: 2023-02-16

## 2023-01-17 NOTE — PROGRESS NOTES
2023    TELEHEALTH EVALUATION -- Audio/Visual (During COVID-19 public health emergency)    HPI:    Karen Craig (:  1995) has requested an audio/video evaluation for the following concern(s):    Current Meds:  Vyvanse 30 mg daily and Adderall 5 mg daily PRN              -Takes 5-6 days/week for vyvanse, 2-3 days per week for adderall     Sleeping: No problems reported  Eating: No reported changes in appetite.   Weight Loss/Gain: No concerns reported.   Side effects to the medication: Denied CP, Palpitations, SOB, Changes in bowel habits.    working from home. Doing well.     Review of Systems   Constitutional:  Negative for appetite change, chills, fatigue and fever.   HENT:  Negative for congestion.    Eyes:  Negative for pain and visual disturbance.   Respiratory:  Negative for cough and shortness of breath.    Cardiovascular:  Negative for chest pain and palpitations.   Gastrointestinal:  Negative for abdominal pain, constipation and diarrhea.   Genitourinary:  Negative for difficulty urinating.   Musculoskeletal:  Negative for arthralgias.   Skin:  Negative for rash and wound.   Neurological:  Negative for dizziness, weakness, light-headedness and headaches.   Hematological:  Does not bruise/bleed easily.   Psychiatric/Behavioral:  Negative for behavioral problems.      Prior to Visit Medications    Medication Sig Taking? Authorizing Provider   amphetamine-dextroamphetamine (ADDERALL, 5MG,) 5 MG tablet Take 1 tablet by mouth daily as needed (decreased focus/concentration) for up to 30 days. Yes Jessica Larsen MD   lisdexamfetamine (VYVANSE) 30 MG capsule Take 1 capsule by mouth every morning for 30 days. Yes Jessica Larsen MD   LILLOW 0.15-30 MG-MCG per tablet  Yes Historical Provider, MD       Social History     Tobacco Use    Smoking status: Never    Smokeless tobacco: Never   Vaping Use    Vaping Use: Never used   Substance Use Topics    Alcohol use: Yes     Comment:  Socially    Drug use: Never        Allergies   Allergen Reactions    Amoxicillin Hives     Unknown patient states she has been tested for that when she was younger      Sulfa Antibiotics Other (See Comments)   , History reviewed. No pertinent past medical history. , History reviewed. No pertinent surgical history. ,   Social History     Tobacco Use    Smoking status: Never    Smokeless tobacco: Never   Vaping Use    Vaping Use: Never used   Substance Use Topics    Alcohol use: Yes     Comment: Socially    Drug use: Never   ,   Family History   Problem Relation Age of Onset    Hypertension Mother     No Known Problems Father     No Known Problems Brother     No Known Problems Brother    ,   Immunization History   Administered Date(s) Administered    COVID-19, PFIZER PURPLE top, DILUTE for use, (age 15 y+), 30mcg/0.3mL 04/01/2021, 04/01/2021, 04/25/2021    DTP 1995, 10/28/1998    DTP/HiB 1995, 02/04/1994    DTaP (Infanrix) 04/24/1995    HPV Quadrivalent (Gardasil) 01/04/2006, 10/31/2006, 08/22/2007    Hepatitis A Ped/Adol (Havrix, Vaqta) 08/22/2007, 06/12/2009    Hepatitis B 1995, 02/04/1994, 07/08/1994    Hib vaccine 1995, 02/04/1994, 04/08/1994, 10/17/1994    MMR 02/13/1995, 10/28/1998    Meningococcal MCV4P (Menactra) 08/22/2007, 08/09/2012    Polio OPV 1993, 02/04/1994, 04/24/1995, 10/28/1998    Tdap (Boostrix, Adacel) 10/31/2006    Varicella (Varivax) 11/30/1995, 10/31/2006   ,   Health Maintenance   Topic Date Due    HIV screen  Never done    Hepatitis C screen  Never done    Pap smear  Never done    DTaP/Tdap/Td vaccine (6 - Td or Tdap) 10/31/2016    COVID-19 Vaccine (3 - Booster for Pfizer series) 06/20/2021    Flu vaccine (1) Never done    Depression Screen  06/02/2023    Hepatitis A vaccine  Completed    Hib vaccine  Completed    Varicella vaccine  Completed    Meningococcal (ACWY) vaccine  Completed    Pneumococcal 0-64 years Vaccine  Aged Out       PHYSICAL EXAMINATION:  [ INSTRUCTIONS:  \"[x]\" Indicates a positive item  \"[]\" Indicates a negative item  -- DELETE ALL ITEMS NOT EXAMINED]  [x] Alert  [x] Oriented to person/place/time    [x] No apparent distress  [] Toxic appearing    [] Face flushed appearing [x] Sclera clear  [] Lips are cyanotic      [x] Breathing appears normal  [] Appears tachypneic      [] Rash on visible skin    [x] Cranial Nerves II-XII grossly intact    [] Motor grossly intact in visible upper extremities    [] Motor grossly intact in visible lower extremities    [x] Normal Mood  [] Anxious appearing    [] Depressed appearing  [] Confused appearing      Due to this being a TeleHealth encounter, evaluation of the following organ systems is limited: Vitals/Constitutional/EENT/Resp/CV/GI//MS/Neuro/Skin/Heme-Lymph-Imm. ASSESSMENT/PLAN:  1. ADHD (attention deficit hyperactivity disorder), inattentive type  PDMP reviewed  Follow up in 3 months to continue to monitor controlled substances  No suspicious activity  Stable on this regimen. Doesn't need vyvanse refilled yet  - amphetamine-dextroamphetamine (ADDERALL, 5MG,) 5 MG tablet; Take 1 tablet by mouth daily as needed (decreased focus/concentration) for up to 30 days. Dispense: 30 tablet; Refill: 0      No follow-ups on file. Future Appointments   Date Time Provider Willi Ponce   1/17/2023  8:45 AM Daphne Wilkinson MD [facility] - D[facility]D       An  electronic signature was used to authenticate this note. --Daphne Wilkinson MD on 1/17/2023 at 8:43 AM    {Coding Help -- Use CPT 57167-58368 with EM elements or Time rules for Office Visits. :    >20 minutes were spent on the digital evaluation and management of this patient.     Pursuant to the emergency declaration under the [facility], 1135 waiver authority and the [employer] Act, this Virtual  Visit was conducted, with patient's consent, to reduce the patient's risk of exposure to COVID-19 and provide continuity of care for an established patient. Services were provided through a video synchronous discussion virtually to substitute for in-person clinic visit.

## 2023-01-31 DIAGNOSIS — F90.0 ADHD (ATTENTION DEFICIT HYPERACTIVITY DISORDER), INATTENTIVE TYPE: ICD-10-CM

## 2023-01-31 NOTE — TELEPHONE ENCOUNTER
Medication:   Requested Prescriptions     Pending Prescriptions Disp Refills    lisdexamfetamine (VYVANSE) 30 MG capsule 30 capsule 0     Sig: Take 1 capsule by mouth every morning for 30 days. Last Filled:  11/29/2022    Patient Phone Number: [phone] (home)     Last appt: 1/17/2023   Next appt: Visit date not found            Return in about 3 months (around 4/17/2023).

## 2023-02-20 DIAGNOSIS — F90.0 ADHD (ATTENTION DEFICIT HYPERACTIVITY DISORDER), INATTENTIVE TYPE: ICD-10-CM

## 2023-02-20 RX ORDER — DEXTROAMPHETAMINE SACCHARATE, AMPHETAMINE ASPARTATE, DEXTROAMPHETAMINE SULFATE AND AMPHETAMINE SULFATE 1.25; 1.25; 1.25; 1.25 MG/1; MG/1; MG/1; MG/1
5 TABLET ORAL DAILY PRN
Qty: 30 TABLET | Refills: 0 | Status: SHIPPED | OUTPATIENT
Start: 2023-02-20 | End: 2023-03-22

## 2023-02-20 NOTE — TELEPHONE ENCOUNTER
Medication:   Requested Prescriptions     Pending Prescriptions Disp Refills    amphetamine-dextroamphetamine (ADDERALL, 5MG,) 5 MG tablet 30 tablet 0     Sig: Take 1 tablet by mouth daily as needed (decreased focus/concentration) for up to 30 days. Last Filled:  1/17/2023     Patient Phone Number: [phone] (home)     Last appt: 1/17/2023   Next appt: Visit date not found            Return in about 3 months (around 4/17/2023).

## 2023-03-24 DIAGNOSIS — F90.0 ADHD (ATTENTION DEFICIT HYPERACTIVITY DISORDER), INATTENTIVE TYPE: ICD-10-CM

## 2023-03-24 RX ORDER — DEXTROAMPHETAMINE SACCHARATE, AMPHETAMINE ASPARTATE, DEXTROAMPHETAMINE SULFATE AND AMPHETAMINE SULFATE 1.25; 1.25; 1.25; 1.25 MG/1; MG/1; MG/1; MG/1
5 TABLET ORAL DAILY PRN
Qty: 30 TABLET | Refills: 0 | Status: SHIPPED | OUTPATIENT
Start: 2023-03-24 | End: 2023-04-23

## 2023-03-24 NOTE — TELEPHONE ENCOUNTER
Medication:   Requested Prescriptions     Pending Prescriptions Disp Refills    lisdexamfetamine (VYVANSE) 30 MG capsule 30 capsule 0     Sig: Take 1 capsule by mouth every morning for 30 days. amphetamine-dextroamphetamine (ADDERALL, 5MG,) 5 MG tablet 30 tablet 0     Sig: Take 1 tablet by mouth daily as needed (decreased focus/concentration) for up to 30 days. Last Filled:  2/20/23 1/31/23    Patient Phone Number: [phone] (home)     Last appt: 1/17/2023   Next appt: Visit date not found    Last OARRS:   RX Monitoring 2/17/2022   Periodic Controlled Substance Monitoring No signs of potential drug abuse or diversion identified.

## 2023-04-24 DIAGNOSIS — F90.0 ADHD (ATTENTION DEFICIT HYPERACTIVITY DISORDER), INATTENTIVE TYPE: ICD-10-CM

## 2023-04-24 RX ORDER — DEXTROAMPHETAMINE SACCHARATE, AMPHETAMINE ASPARTATE, DEXTROAMPHETAMINE SULFATE AND AMPHETAMINE SULFATE 1.25; 1.25; 1.25; 1.25 MG/1; MG/1; MG/1; MG/1
5 TABLET ORAL DAILY PRN
Qty: 30 TABLET | Refills: 0 | OUTPATIENT
Start: 2023-04-24 | End: 2023-05-24

## 2023-05-01 ENCOUNTER — OFFICE VISIT (OUTPATIENT)
Dept: PRIMARY CARE CLINIC | Age: 28
End: 2023-05-01
Payer: COMMERCIAL

## 2023-05-01 VITALS
BODY MASS INDEX: 25.95 KG/M2 | SYSTOLIC BLOOD PRESSURE: 110 MMHG | DIASTOLIC BLOOD PRESSURE: 71 MMHG | HEIGHT: 62 IN | OXYGEN SATURATION: 99 % | HEART RATE: 74 BPM | WEIGHT: 141 LBS

## 2023-05-01 DIAGNOSIS — F90.0 ADHD (ATTENTION DEFICIT HYPERACTIVITY DISORDER), INATTENTIVE TYPE: Primary | ICD-10-CM

## 2023-05-01 PROCEDURE — 99214 OFFICE O/P EST MOD 30 MIN: CPT | Performed by: FAMILY MEDICINE

## 2023-05-01 RX ORDER — DEXTROAMPHETAMINE SACCHARATE, AMPHETAMINE ASPARTATE, DEXTROAMPHETAMINE SULFATE AND AMPHETAMINE SULFATE 1.25; 1.25; 1.25; 1.25 MG/1; MG/1; MG/1; MG/1
5 TABLET ORAL DAILY PRN
Qty: 30 TABLET | Refills: 0 | Status: SHIPPED | OUTPATIENT
Start: 2023-05-01 | End: 2023-05-31

## 2023-05-01 ASSESSMENT — PATIENT HEALTH QUESTIONNAIRE - PHQ9
SUM OF ALL RESPONSES TO PHQ QUESTIONS 1-9: 0
1. LITTLE INTEREST OR PLEASURE IN DOING THINGS: 0
SUM OF ALL RESPONSES TO PHQ9 QUESTIONS 1 & 2: 0
SUM OF ALL RESPONSES TO PHQ QUESTIONS 1-9: 0
2. FEELING DOWN, DEPRESSED OR HOPELESS: 0

## 2023-05-01 ASSESSMENT — ENCOUNTER SYMPTOMS
SHORTNESS OF BREATH: 0
ABDOMINAL PAIN: 0
CONSTIPATION: 0
DIARRHEA: 0
COUGH: 0
EYE PAIN: 0

## 2023-06-04 DIAGNOSIS — F90.0 ADHD (ATTENTION DEFICIT HYPERACTIVITY DISORDER), INATTENTIVE TYPE: ICD-10-CM

## 2023-06-05 RX ORDER — DEXTROAMPHETAMINE SACCHARATE, AMPHETAMINE ASPARTATE, DEXTROAMPHETAMINE SULFATE AND AMPHETAMINE SULFATE 1.25; 1.25; 1.25; 1.25 MG/1; MG/1; MG/1; MG/1
5 TABLET ORAL DAILY PRN
Qty: 30 TABLET | Refills: 0 | Status: SHIPPED | OUTPATIENT
Start: 2023-06-05 | End: 2023-07-05

## 2023-06-05 NOTE — TELEPHONE ENCOUNTER
Medication:   Requested Prescriptions     Pending Prescriptions Disp Refills    lisdexamfetamine (VYVANSE) 30 MG capsule 30 capsule 0     Sig: Take 1 capsule by mouth every morning for 30 days. amphetamine-dextroamphetamine (ADDERALL, 5MG,) 5 MG tablet 30 tablet 0     Sig: Take 1 tablet by mouth daily as needed (decreased focus/concentration) for up to 30 days. Last Filled:  5/1/23    Patient Phone Number: [phone] (home)     Last appt: 5/1/2023   Next appt: 8/1/2023    Last OARRS:   RX Monitoring 2/17/2022   Periodic Controlled Substance Monitoring No signs of potential drug abuse or diversion identified.

## 2023-07-07 DIAGNOSIS — F90.0 ADHD (ATTENTION DEFICIT HYPERACTIVITY DISORDER), INATTENTIVE TYPE: ICD-10-CM

## 2023-07-07 RX ORDER — DEXTROAMPHETAMINE SACCHARATE, AMPHETAMINE ASPARTATE, DEXTROAMPHETAMINE SULFATE AND AMPHETAMINE SULFATE 1.25; 1.25; 1.25; 1.25 MG/1; MG/1; MG/1; MG/1
5 TABLET ORAL DAILY PRN
Qty: 30 TABLET | Refills: 0 | Status: SHIPPED | OUTPATIENT
Start: 2023-07-07 | End: 2023-08-06

## 2023-07-07 NOTE — TELEPHONE ENCOUNTER
Medication:   Requested Prescriptions     Pending Prescriptions Disp Refills    lisdexamfetamine (VYVANSE) 30 MG capsule 30 capsule 0     Sig: Take 1 capsule by mouth every morning for 30 days. amphetamine-dextroamphetamine (ADDERALL, 5MG,) 5 MG tablet 30 tablet 0     Sig: Take 1 tablet by mouth daily as needed (decreased focus/concentration) for up to 30 days. Last Filled:  6/5/23    Patient Phone Number: [phone] (home)     Last appt: 5/1/2023   Next appt: 8/1/2023    Last OARRS:   RX Monitoring 2/17/2022   Periodic Controlled Substance Monitoring No signs of potential drug abuse or diversion identified.

## 2023-08-07 ENCOUNTER — TELEMEDICINE (OUTPATIENT)
Dept: PRIMARY CARE CLINIC | Age: 28
End: 2023-08-07
Payer: COMMERCIAL

## 2023-08-07 DIAGNOSIS — F90.0 ADHD (ATTENTION DEFICIT HYPERACTIVITY DISORDER), INATTENTIVE TYPE: ICD-10-CM

## 2023-08-07 PROCEDURE — 99214 OFFICE O/P EST MOD 30 MIN: CPT | Performed by: FAMILY MEDICINE

## 2023-08-07 RX ORDER — DEXTROAMPHETAMINE SACCHARATE, AMPHETAMINE ASPARTATE, DEXTROAMPHETAMINE SULFATE AND AMPHETAMINE SULFATE 1.25; 1.25; 1.25; 1.25 MG/1; MG/1; MG/1; MG/1
5 TABLET ORAL DAILY PRN
Qty: 30 TABLET | Refills: 0 | Status: CANCELLED | OUTPATIENT
Start: 2023-08-07 | End: 2023-09-06

## 2023-08-07 RX ORDER — DEXTROAMPHETAMINE SACCHARATE, AMPHETAMINE ASPARTATE, DEXTROAMPHETAMINE SULFATE AND AMPHETAMINE SULFATE 3.75; 3.75; 3.75; 3.75 MG/1; MG/1; MG/1; MG/1
15 TABLET ORAL 2 TIMES DAILY
Qty: 60 TABLET | Refills: 0 | Status: SHIPPED | OUTPATIENT
Start: 2023-08-07 | End: 2023-09-06

## 2023-08-07 ASSESSMENT — ENCOUNTER SYMPTOMS
DIARRHEA: 0
ABDOMINAL PAIN: 0
COUGH: 0
CONSTIPATION: 0
EYE PAIN: 0
SHORTNESS OF BREATH: 0

## 2023-08-07 NOTE — PROGRESS NOTES
2023    TELEHEALTH EVALUATION -- Audio/Visual (During HDXGR-50 public health emergency)    HPI:    Gabriel Coffey (:  1995) has requested an audio/video evaluation for the following concern(s):    Current Meds:  Vyvanse 30 mg daily and Adderall 5 mg daily PRN              -Takes 5 days/week for vyvanse, 2-3 days per week for adderall  She is finding it difficult to fall asleep given the time she is taking her vyvanse for work. She would like to try an IR BID vs and XR and IR. Sleeping: as above. Eating: No reported changes in appetite. Weight Loss/Gain: No concerns reported. Side effects to the medication: Denied CP, Palpitations, SOB, Changes in bowel habits.  working from home. Doing well. Review of Systems   Constitutional:  Negative for appetite change, chills, fatigue and fever. HENT:  Negative for congestion. Eyes:  Negative for pain and visual disturbance. Respiratory:  Negative for cough and shortness of breath. Cardiovascular:  Negative for chest pain and palpitations. Gastrointestinal:  Negative for abdominal pain, constipation and diarrhea. Genitourinary:  Negative for difficulty urinating. Musculoskeletal:  Negative for arthralgias. Skin:  Negative for rash and wound. Neurological:  Negative for dizziness, weakness, light-headedness and headaches. Hematological:  Does not bruise/bleed easily. Psychiatric/Behavioral:  Positive for sleep disturbance. Negative for behavioral problems. Prior to Visit Medications    Medication Sig Taking? Authorizing Provider   amphetamine-dextroamphetamine (ADDERALL, 15MG,) 15 MG tablet Take 1 tablet by mouth 2 times daily for 30 days. Max Daily Amount: 30 mg Yes Celeste Martinez MD   lisdexamfetamine (VYVANSE) 30 MG capsule Take 1 capsule by mouth every morning for 30 days.  Yes Celeste Martinez MD   amphetamine-dextroamphetamine (ADDERALL, 5MG,) 5 MG tablet Take 1 tablet by mouth daily as needed

## 2023-10-01 DIAGNOSIS — F90.0 ADHD (ATTENTION DEFICIT HYPERACTIVITY DISORDER), INATTENTIVE TYPE: ICD-10-CM

## 2023-10-02 RX ORDER — DEXTROAMPHETAMINE SACCHARATE, AMPHETAMINE ASPARTATE, DEXTROAMPHETAMINE SULFATE AND AMPHETAMINE SULFATE 3.75; 3.75; 3.75; 3.75 MG/1; MG/1; MG/1; MG/1
15 TABLET ORAL 2 TIMES DAILY
Qty: 60 TABLET | Refills: 0 | Status: SHIPPED | OUTPATIENT
Start: 2023-10-02 | End: 2023-11-01

## 2023-10-02 NOTE — TELEPHONE ENCOUNTER
Medication:   Requested Prescriptions     Pending Prescriptions Disp Refills    amphetamine-dextroamphetamine (ADDERALL, 15MG,) 15 MG tablet 60 tablet 0     Sig: Take 1 tablet by mouth 2 times daily for 30 days. Max Daily Amount: 30 mg        Last Filled:  08/07/23    Patient Phone Number: [phone] (home)     Last appt: 8/7/2023   Next appt: 11/13/2023    Last OARRS:       2/17/2022     3:26 PM   RX Monitoring   Periodic Controlled Substance Monitoring No signs of potential drug abuse or diversion identified.

## 2023-10-02 NOTE — TELEPHONE ENCOUNTER
Medication:   Requested Prescriptions     Pending Prescriptions Disp Refills    amphetamine-dextroamphetamine (ADDERALL, 15MG,) 15 MG tablet 60 tablet 0     Sig: Take 1 tablet by mouth 2 times daily for 30 days. Max Daily Amount: 30 mg        Last Filled:  08/07/23    Patient Phone Number: [phone] (home)     Last appt: 8/7/2023   Next appt: Visit date not found    Last OARRS:       2/17/2022     3:26 PM   RX Monitoring   Periodic Controlled Substance Monitoring No signs of potential drug abuse or diversion identified.

## 2023-11-13 ENCOUNTER — OFFICE VISIT (OUTPATIENT)
Dept: PRIMARY CARE CLINIC | Age: 28
End: 2023-11-13
Payer: COMMERCIAL

## 2023-11-13 VITALS
DIASTOLIC BLOOD PRESSURE: 80 MMHG | TEMPERATURE: 98.2 F | WEIGHT: 143.2 LBS | BODY MASS INDEX: 26.35 KG/M2 | SYSTOLIC BLOOD PRESSURE: 132 MMHG | HEART RATE: 85 BPM | HEIGHT: 62 IN

## 2023-11-13 DIAGNOSIS — F90.0 ADHD (ATTENTION DEFICIT HYPERACTIVITY DISORDER), INATTENTIVE TYPE: ICD-10-CM

## 2023-11-13 PROCEDURE — 99213 OFFICE O/P EST LOW 20 MIN: CPT | Performed by: STUDENT IN AN ORGANIZED HEALTH CARE EDUCATION/TRAINING PROGRAM

## 2023-11-13 RX ORDER — DEXTROAMPHETAMINE SACCHARATE, AMPHETAMINE ASPARTATE, DEXTROAMPHETAMINE SULFATE AND AMPHETAMINE SULFATE 3.75; 3.75; 3.75; 3.75 MG/1; MG/1; MG/1; MG/1
15 TABLET ORAL 2 TIMES DAILY
Qty: 60 TABLET | Refills: 0 | Status: SHIPPED | OUTPATIENT
Start: 2023-11-13 | End: 2023-12-13

## 2023-11-13 SDOH — ECONOMIC STABILITY: HOUSING INSECURITY
IN THE LAST 12 MONTHS, WAS THERE A TIME WHEN YOU DID NOT HAVE A STEADY PLACE TO SLEEP OR SLEPT IN A SHELTER (INCLUDING NOW)?: NO

## 2023-11-13 SDOH — ECONOMIC STABILITY: INCOME INSECURITY: HOW HARD IS IT FOR YOU TO PAY FOR THE VERY BASICS LIKE FOOD, HOUSING, MEDICAL CARE, AND HEATING?: NOT HARD AT ALL

## 2023-11-13 SDOH — ECONOMIC STABILITY: FOOD INSECURITY: WITHIN THE PAST 12 MONTHS, YOU WORRIED THAT YOUR FOOD WOULD RUN OUT BEFORE YOU GOT MONEY TO BUY MORE.: NEVER TRUE

## 2023-11-13 SDOH — ECONOMIC STABILITY: FOOD INSECURITY: WITHIN THE PAST 12 MONTHS, THE FOOD YOU BOUGHT JUST DIDN'T LAST AND YOU DIDN'T HAVE MONEY TO GET MORE.: NEVER TRUE

## 2023-11-13 ASSESSMENT — ENCOUNTER SYMPTOMS
SHORTNESS OF BREATH: 0
COUGH: 0
NAUSEA: 0
VOMITING: 0
CHEST TIGHTNESS: 0

## 2024-01-14 DIAGNOSIS — F90.0 ADHD (ATTENTION DEFICIT HYPERACTIVITY DISORDER), INATTENTIVE TYPE: ICD-10-CM

## 2024-01-15 RX ORDER — DEXTROAMPHETAMINE SACCHARATE, AMPHETAMINE ASPARTATE, DEXTROAMPHETAMINE SULFATE AND AMPHETAMINE SULFATE 3.75; 3.75; 3.75; 3.75 MG/1; MG/1; MG/1; MG/1
15 TABLET ORAL 2 TIMES DAILY
Qty: 60 TABLET | Refills: 0 | Status: SHIPPED | OUTPATIENT
Start: 2024-01-15 | End: 2024-02-14

## 2024-01-15 NOTE — TELEPHONE ENCOUNTER
Medication:   Requested Prescriptions     Pending Prescriptions Disp Refills    amphetamine-dextroamphetamine (ADDERALL, 15MG,) 15 MG tablet 60 tablet 0     Sig: Take 1 tablet by mouth 2 times daily for 30 days. Max Daily Amount: 30 mg        Last Filled:  11/13/23    Patient Phone Number: [phone] (home)     Last appt: 11/13/2023   Return in about 6 months (around 5/13/2024) for ADD/ADHD (VV OK).  Next appt: Visit date not found    Last OARRS:       2/17/2022     3:26 PM   RX Monitoring   Periodic Controlled Substance Monitoring No signs of potential drug abuse or diversion identified.

## 2024-02-29 DIAGNOSIS — F90.0 ADHD (ATTENTION DEFICIT HYPERACTIVITY DISORDER), INATTENTIVE TYPE: ICD-10-CM

## 2024-02-29 RX ORDER — DEXTROAMPHETAMINE SACCHARATE, AMPHETAMINE ASPARTATE, DEXTROAMPHETAMINE SULFATE AND AMPHETAMINE SULFATE 3.75; 3.75; 3.75; 3.75 MG/1; MG/1; MG/1; MG/1
15 TABLET ORAL 2 TIMES DAILY
Qty: 60 TABLET | Refills: 0 | Status: SHIPPED | OUTPATIENT
Start: 2024-02-29 | End: 2024-03-30

## 2024-02-29 NOTE — TELEPHONE ENCOUNTER
Medication:   Requested Prescriptions     Pending Prescriptions Disp Refills    amphetamine-dextroamphetamine (ADDERALL, 15MG,) 15 MG tablet 60 tablet 0     Sig: Take 1 tablet by mouth 2 times daily for 30 days. Max Daily Amount: 30 mg        Last Filled:  1/15/24    Last appt: 11/13/2023   Next appt: Visit date not found   Return in about 6 months (around 5/13/2024) for ADD/ADHD (VV OK)  Last OARRS:       2/17/2022     3:26 PM   RX Monitoring   Periodic Controlled Substance Monitoring No signs of potential drug abuse or diversion identified.

## 2024-04-08 NOTE — PROGRESS NOTES
[facility]   Cardiac Electrophysiology Consultation   Date: 1/6/2022  Reason for Consultation:  LHD  Consult Requesting Physician: No att. providers found     Assessment/Plan:  1. Neurocardiogenic symptoms  2. ADHD    Recommendations:  1. Lifestyle modifications including increase hydration, increase in salt intake, stress reduction; and isometric exercises (handout given to pt)  2. Consider tilt table study in 6 months if the lifestyle modifications are not helpful in relieving her symptoms  3. Follow up in 6 months with EP NP. Gave her the option to not f/u if she well after implementing the lifestyle modifications. Chief Complaint:   Chief Complaint   Patient presents with    New Patient     Follow up after Echo As Per Dr. Jarocho Grey       HPI: Mindi Martinez is a 27 y.o. female referred by Dr. Jarocho Grey for LHD. No significant medical or cardiac history. Pt with c/o LHD, both with position changes and while driving. Monitor (8/2021) was normal with symptoms correlating with SR.  ST noted from approx 6 pm into the evening. Typically, she runs after she gets off work around 6 pm.  Echo was normal.    Interval History: Today, she is here for symptomatic evaluation. She reports that she has been feeling LHD and dizzy for the past year, since 2/2020. The dizziness is worse when she drives, as if things are \"out of focus. \"  She also notes the dizziness when she's in a crowded place or when she is standing up. She also c/o occasional rapid fluttering and tightness in her chest, sometimes associated with the dizziness, but not always. She started Zoloft for anxiety this past year and these episodes have slightly improved, but not resolved. Denies syncope. Denies any illness in the past couple years. Denies family h/o cardiac issues besides grandmother with a valve replacement. 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 She takes Vyvanse PRN for ADHD. No past medical history on file.      No past surgical history on file. Allergies: Allergies   Allergen Reactions    Amoxicillin Hives     Unknown patient states she has been tested for that when she was younger         Medication:   Prior to Admission medications    Medication Sig Start Date End Date Taking? Authorizing Provider   amphetamine-dextroamphetamine (ADDERALL, 5MG,) 5 MG tablet Take 1 tablet by mouth daily as needed (decreased focus/concentration) for up to 30 days. 12/13/21 1/12/22 Yes Elnoria Kehr, MD Rogblade Chowdhury 0.15-30 MG-MCG per tablet  2/5/20  Yes Historical Provider, MD   lisdexamfetamine (VYVANSE) 30 MG capsule Take 1 capsule by mouth every morning for 30 days. Patient not taking: Reported on 1/6/2022 12/2/21 1/6/22  Elnoria Kehr, MD   sertraline (ZOLOFT) 50 MG tablet Take 1 tablet by mouth daily  Patient not taking: Reported on 1/6/2022 11/2/21   Elnoria Kehr, MD       Social History:   reports that she has never smoked. She has never used smokeless tobacco. She reports current alcohol use. She reports that she does not use drugs. Family History:  family history includes Hypertension in her mother; No Known Problems in her brother, brother, and father. Reviewed. Denies family history of sudden cardiac death, arrhythmia, premature CAD    Review of System:    · General ROS: negative for - chills, fever   · Psychological ROS: negative for - anxiety or depression  · Ophthalmic ROS: negative for - eye pain or loss of vision  · ENT ROS: negative for - epistaxis, headaches, nasal discharge, sore throat   · Allergy and Immunology ROS: negative for - hives, nasal congestion   · Hematological and Lymphatic ROS: negative for - bleeding problems, blood clots, bruising or jaundice  · Endocrine ROS: negative for - skin changes, temperature intolerance or unexpected weight changes  · Respiratory ROS: negative for - cough, hemoptysis, pleuritic pain, SOB, sputum changes or wheezing  · Cardiovascular ROS: Per HPI.    · Gastrointestinal ROS: negative for - abdominal pain, blood in stools, diarrhea, hematemesis, melena, nausea/vomiting or swallowing difficulty/pain  · Genito-Urinary ROS: negative for - dysuria or incontinence  · Musculoskeletal ROS: negative for - joint swelling or muscle pain  · Neurological ROS: negative for - confusion, dizziness, gait disturbance, headaches, numbness/tingling, seizures, speech problems, tremors, visual changes or weakness  · Dermatological ROS: negative for - rash    Physical Examination:  Vitals:    01/06/22 0902   BP: 103/71   Pulse: 68       · Constitutional: Oriented. No distress. · Head: Normocephalic and atraumatic. · Mouth/Throat: Oropharynx is clear and moist.   · Eyes: Conjunctivae normal. EOM are normal.   · Neck: Normal range of motion. Neck supple. No rigidity. No JVD present. · Cardiovascular: Normal rate, regular rhythm, S1&S2 and intact distal pulses. · Pulmonary/Chest: Bilateral respiratory sounds. No wheezes. No rhonchi. · Abdominal: Soft. Bowel sounds present. No distension, No tenderness. · Musculoskeletal: No tenderness. No edema    · Lymphadenopathy: Has no cervical adenopathy. · Neurological: Alert and oriented. Cranial nerve appears intact, No Gross deficit   · Skin: Skin is warm and dry. No rash noted. · Psychiatric: Has a normal mood, affect and behavior     Labs:  Reviewed. ECG: reviewed, Sinus  Rhythm, with QRS duration 98 ms. No pathologic Q waves, ventricular pre-excitation, or QT prolongation. Studies:   1. 14 day Zio (8/6-8/20/21):  SR with average HR 94 (), <1% PAC, <1% PVC, symptoms correlating with SR    2. Echo 11/23/21:   Summary   Normal left ventricle size, wall thickness, and systolic function with an   estimated ejection fraction of 55-60%. No regional wall motion abnormalities are seen. Diastolic filling parameters suggests normal diastolic function. No significant valvular regurgitation or stenosis.     The [facility], echocardiogram, stress test, [employer] com    http://silvia-mannie.info/    https://www.youtube.com/watch?v=[email or web address]    Thank you for allowing me to participate in the care of Logan Glass. All questions and concerns were addressed to the patient/family. Alternatives to my treatment were discussed. Jeny Boudreaux RN, am scribing for and in the presence of Dr. Mukul Arceo. 01/06/22 9:22 AM  JOSELIN Crenshaw MD, personally performed the services prescribed in this documentation as scribed by Ms. Cheng Wilson RN in my presence and it is both accurate and complete.        Heath Glover MD  Cardiac Electrophysiology  [address]

## 2024-04-20 DIAGNOSIS — F90.0 ADHD (ATTENTION DEFICIT HYPERACTIVITY DISORDER), INATTENTIVE TYPE: ICD-10-CM

## 2024-04-22 RX ORDER — DEXTROAMPHETAMINE SACCHARATE, AMPHETAMINE ASPARTATE, DEXTROAMPHETAMINE SULFATE AND AMPHETAMINE SULFATE 3.75; 3.75; 3.75; 3.75 MG/1; MG/1; MG/1; MG/1
15 TABLET ORAL 2 TIMES DAILY
Qty: 60 TABLET | Refills: 0 | Status: SHIPPED | OUTPATIENT
Start: 2024-04-22 | End: 2024-05-22

## 2024-04-22 NOTE — TELEPHONE ENCOUNTER
Medication:   Requested Prescriptions     Pending Prescriptions Disp Refills    amphetamine-dextroamphetamine (ADDERALL, 15MG,) 15 MG tablet 60 tablet 0     Sig: Take 1 tablet by mouth 2 times daily for 30 days. Max Daily Amount: 30 mg        Last Filled:  02/29/24    Patient Phone Number: [phone] (home)     Last appt: 11/13/2023   Return in about 6 months (around 5/13/2024) for ADD/ADHD (VV OK).  Next appt: Visit date not found    Last OARRS:       2/17/2022     3:26 PM   RX Monitoring   Periodic Controlled Substance Monitoring No signs of potential drug abuse or diversion identified.

## 2024-05-06 ASSESSMENT — PATIENT HEALTH QUESTIONNAIRE - PHQ9
SUM OF ALL RESPONSES TO PHQ QUESTIONS 1-9: 0
SUM OF ALL RESPONSES TO PHQ QUESTIONS 1-9: 0
2. FEELING DOWN, DEPRESSED OR HOPELESS: NOT AT ALL
SUM OF ALL RESPONSES TO PHQ QUESTIONS 1-9: 0
SUM OF ALL RESPONSES TO PHQ QUESTIONS 1-9: 0
1. LITTLE INTEREST OR PLEASURE IN DOING THINGS: NOT AT ALL
SUM OF ALL RESPONSES TO PHQ9 QUESTIONS 1 & 2: 0
SUM OF ALL RESPONSES TO PHQ9 QUESTIONS 1 & 2: 0
1. LITTLE INTEREST OR PLEASURE IN DOING THINGS: NOT AT ALL
2. FEELING DOWN, DEPRESSED OR HOPELESS: NOT AT ALL

## 2024-05-13 ENCOUNTER — OFFICE VISIT (OUTPATIENT)
Dept: PRIMARY CARE CLINIC | Age: 29
End: 2024-05-13
Payer: COMMERCIAL

## 2024-05-13 VITALS
HEIGHT: 62 IN | HEART RATE: 72 BPM | WEIGHT: 141 LBS | DIASTOLIC BLOOD PRESSURE: 77 MMHG | BODY MASS INDEX: 25.95 KG/M2 | TEMPERATURE: 97.9 F | SYSTOLIC BLOOD PRESSURE: 125 MMHG

## 2024-05-13 DIAGNOSIS — F90.0 ADHD (ATTENTION DEFICIT HYPERACTIVITY DISORDER), INATTENTIVE TYPE: Primary | ICD-10-CM

## 2024-05-13 PROCEDURE — 99213 OFFICE O/P EST LOW 20 MIN: CPT | Performed by: STUDENT IN AN ORGANIZED HEALTH CARE EDUCATION/TRAINING PROGRAM

## 2024-05-13 RX ORDER — DEXTROAMPHETAMINE SACCHARATE, AMPHETAMINE ASPARTATE, DEXTROAMPHETAMINE SULFATE AND AMPHETAMINE SULFATE 3.75; 3.75; 3.75; 3.75 MG/1; MG/1; MG/1; MG/1
15 TABLET ORAL 2 TIMES DAILY
Qty: 60 TABLET | Refills: 0 | Status: SHIPPED | OUTPATIENT
Start: 2024-05-13 | End: 2024-05-13

## 2024-05-13 RX ORDER — DEXTROAMPHETAMINE SACCHARATE, AMPHETAMINE ASPARTATE, DEXTROAMPHETAMINE SULFATE AND AMPHETAMINE SULFATE 3.75; 3.75; 3.75; 3.75 MG/1; MG/1; MG/1; MG/1
15 TABLET ORAL 2 TIMES DAILY
Qty: 180 TABLET | Refills: 0 | Status: SHIPPED | OUTPATIENT
Start: 2024-05-13 | End: 2024-08-11

## 2024-05-13 ASSESSMENT — ENCOUNTER SYMPTOMS
VOMITING: 0
NAUSEA: 0
CHEST TIGHTNESS: 0

## 2024-05-13 NOTE — PROGRESS NOTES
2024     Karen Craig (:  1995) is a 28 y.o. female, here for evaluation of the following medical concerns:    HPI  ADD/ADHD:  Current treatment: Adderall- 15 BID, which has been effective.  Residual symptoms: none. Medication side effects: None. Patient denies anorexia, nausea, vomiting, abdominal pain, involuntary weight loss, palpitations, insomnia, irritability, anxiety, headache, and tremor.    Review of Systems   Constitutional:  Negative for activity change, fatigue and unexpected weight change.   Eyes:  Negative for visual disturbance.   Respiratory:  Negative for cough, chest tightness and shortness of breath.    Cardiovascular:  Negative for chest pain, palpitations and leg swelling.   Gastrointestinal:  Negative for nausea and vomiting.   Endocrine: Negative for cold intolerance, heat intolerance, polydipsia, polyphagia and polyuria.   Genitourinary:  Negative for frequency.   Skin:  Negative for rash.   Neurological:  Negative for dizziness, seizures, syncope, weakness and light-headedness.   Psychiatric/Behavioral:  Negative for agitation, decreased concentration, dysphoric mood, self-injury, sleep disturbance and suicidal ideas. The patient is not nervous/anxious.        Prior to Visit Medications    Medication Sig Taking? Authorizing Provider   amphetamine-dextroamphetamine (ADDERALL, 15MG,) 15 MG tablet Take 1 tablet by mouth 2 times daily for 30 days. Max Daily Amount: 30 mg Yes Garrison Wolf DO        Social History     Tobacco Use    Smoking status: Never    Smokeless tobacco: Never   Substance Use Topics    Alcohol use: Yes     Alcohol/week: 8.0 standard drinks of alcohol     Types: 3 Glasses of wine, 5 Cans of beer per week     Comment: Socially        Vitals:    24 0954   BP: 125/77   Pulse: 72   Temp: 97.9 °F (36.6 °C)   TempSrc: Temporal   Weight: 64 kg (141 lb)   Height: 1.575 m (5' 2\")     Estimated body mass index is 25.79 kg/m² as calculated from the following:

## 2024-06-30 DIAGNOSIS — F90.0 ADHD (ATTENTION DEFICIT HYPERACTIVITY DISORDER), INATTENTIVE TYPE: ICD-10-CM

## 2024-07-01 RX ORDER — DEXTROAMPHETAMINE SACCHARATE, AMPHETAMINE ASPARTATE, DEXTROAMPHETAMINE SULFATE AND AMPHETAMINE SULFATE 3.75; 3.75; 3.75; 3.75 MG/1; MG/1; MG/1; MG/1
15 TABLET ORAL 2 TIMES DAILY
Qty: 180 TABLET | Refills: 0 | Status: SHIPPED | OUTPATIENT
Start: 2024-07-01 | End: 2024-09-29

## 2024-07-01 NOTE — TELEPHONE ENCOUNTER
Medication:   Requested Prescriptions     Pending Prescriptions Disp Refills    amphetamine-dextroamphetamine (ADDERALL, 15MG,) 15 MG tablet 180 tablet 0     Sig: Take 1 tablet by mouth 2 times daily for 90 days. Max Daily Amount: 30 mg        Last Filled:  05/13/24    Patient Phone Number: [phone] (home)     Last appt: 5/13/2024 Return in about 6 months (around 11/13/2024) for Annual Wellness.   Next appt: Visit date not found    Last OARRS:       2/17/2022     3:26 PM   RX Monitoring   Periodic Controlled Substance Monitoring No signs of potential drug abuse or diversion identified.

## 2024-07-15 DIAGNOSIS — F90.0 ADHD (ATTENTION DEFICIT HYPERACTIVITY DISORDER), INATTENTIVE TYPE: ICD-10-CM

## 2024-07-15 RX ORDER — DEXTROAMPHETAMINE SACCHARATE, AMPHETAMINE ASPARTATE, DEXTROAMPHETAMINE SULFATE AND AMPHETAMINE SULFATE 3.75; 3.75; 3.75; 3.75 MG/1; MG/1; MG/1; MG/1
15 TABLET ORAL 2 TIMES DAILY
Qty: 60 TABLET | Refills: 0 | Status: SHIPPED | OUTPATIENT
Start: 2024-07-15 | End: 2024-08-14

## 2024-07-15 NOTE — PROGRESS NOTES
Medication:   Requested Prescriptions     Pending Prescriptions Disp Refills    amphetamine-dextroamphetamine (ADDERALL, 15MG,) 15 MG tablet 180 tablet 0     Sig: Take 1 tablet by mouth 2 times daily for 90 days. Max Daily Amount: 30 mg        Last Filled:  07/01/24 did not go through do to VPN error    Patient Phone Number: [phone] (home)     Last appt: 5/13/2024   Next appt: Visit date not found  Return in about 6 months (around 11/13/2024) for Annual Wellness.   Last OARRS:       2/17/2022     3:26 PM   RX Monitoring   Periodic Controlled Substance Monitoring No signs of potential drug abuse or diversion identified.

## 2024-08-16 DIAGNOSIS — F90.0 ADHD (ATTENTION DEFICIT HYPERACTIVITY DISORDER), INATTENTIVE TYPE: ICD-10-CM

## 2024-08-19 RX ORDER — DEXTROAMPHETAMINE SACCHARATE, AMPHETAMINE ASPARTATE, DEXTROAMPHETAMINE SULFATE AND AMPHETAMINE SULFATE 3.75; 3.75; 3.75; 3.75 MG/1; MG/1; MG/1; MG/1
15 TABLET ORAL 2 TIMES DAILY
Qty: 60 TABLET | Refills: 0 | Status: SHIPPED | OUTPATIENT
Start: 2024-08-19 | End: 2024-09-18

## 2024-09-20 DIAGNOSIS — F90.0 ADHD (ATTENTION DEFICIT HYPERACTIVITY DISORDER), INATTENTIVE TYPE: ICD-10-CM

## 2024-09-20 RX ORDER — DEXTROAMPHETAMINE SACCHARATE, AMPHETAMINE ASPARTATE, DEXTROAMPHETAMINE SULFATE AND AMPHETAMINE SULFATE 3.75; 3.75; 3.75; 3.75 MG/1; MG/1; MG/1; MG/1
15 TABLET ORAL 2 TIMES DAILY
Qty: 60 TABLET | Refills: 0 | Status: SHIPPED | OUTPATIENT
Start: 2024-09-20 | End: 2024-10-20

## 2024-10-26 DIAGNOSIS — F90.0 ADHD (ATTENTION DEFICIT HYPERACTIVITY DISORDER), INATTENTIVE TYPE: ICD-10-CM

## 2024-10-28 RX ORDER — DEXTROAMPHETAMINE SACCHARATE, AMPHETAMINE ASPARTATE, DEXTROAMPHETAMINE SULFATE AND AMPHETAMINE SULFATE 3.75; 3.75; 3.75; 3.75 MG/1; MG/1; MG/1; MG/1
15 TABLET ORAL 2 TIMES DAILY
Qty: 60 TABLET | Refills: 0 | Status: SHIPPED | OUTPATIENT
Start: 2024-10-28 | End: 2024-11-27

## 2024-10-28 NOTE — TELEPHONE ENCOUNTER
Medication:   Requested Prescriptions     Pending Prescriptions Disp Refills    amphetamine-dextroamphetamine (ADDERALL, 15MG,) 15 MG tablet 60 tablet 0     Sig: Take 1 tablet by mouth 2 times daily for 30 days. Max Daily Amount: 30 mg        Last Filled:  09/20/24    Patient Phone Number: [phone] (home)     Last appt: 5/13/2024 Return in about 6 months (around 11/13/2024) for Annual Wellness.   Next appt: Visit date not found    Last OARRS:       2/17/2022     3:26 PM   RX Monitoring   Periodic Controlled Substance Monitoring No signs of potential drug abuse or diversion identified.

## 2024-11-18 ENCOUNTER — TELEMEDICINE (OUTPATIENT)
Dept: PRIMARY CARE CLINIC | Age: 29
End: 2024-11-18
Payer: COMMERCIAL

## 2024-11-18 DIAGNOSIS — F90.0 ADHD (ATTENTION DEFICIT HYPERACTIVITY DISORDER), INATTENTIVE TYPE: ICD-10-CM

## 2024-11-18 PROCEDURE — 99214 OFFICE O/P EST MOD 30 MIN: CPT | Performed by: STUDENT IN AN ORGANIZED HEALTH CARE EDUCATION/TRAINING PROGRAM

## 2024-11-18 RX ORDER — DEXTROAMPHETAMINE SACCHARATE, AMPHETAMINE ASPARTATE, DEXTROAMPHETAMINE SULFATE AND AMPHETAMINE SULFATE 3.75; 3.75; 3.75; 3.75 MG/1; MG/1; MG/1; MG/1
15 TABLET ORAL 2 TIMES DAILY
Qty: 60 TABLET | Refills: 0 | Status: SHIPPED | OUTPATIENT
Start: 2024-11-18 | End: 2024-12-18

## 2024-11-18 SDOH — ECONOMIC STABILITY: INCOME INSECURITY: HOW HARD IS IT FOR YOU TO PAY FOR THE VERY BASICS LIKE FOOD, HOUSING, MEDICAL CARE, AND HEATING?: NOT HARD AT ALL

## 2024-11-18 SDOH — ECONOMIC STABILITY: FOOD INSECURITY: WITHIN THE PAST 12 MONTHS, THE FOOD YOU BOUGHT JUST DIDN'T LAST AND YOU DIDN'T HAVE MONEY TO GET MORE.: NEVER TRUE

## 2024-11-18 SDOH — ECONOMIC STABILITY: FOOD INSECURITY: WITHIN THE PAST 12 MONTHS, YOU WORRIED THAT YOUR FOOD WOULD RUN OUT BEFORE YOU GOT MONEY TO BUY MORE.: NEVER TRUE

## 2024-11-18 SDOH — ECONOMIC STABILITY: TRANSPORTATION INSECURITY
IN THE PAST 12 MONTHS, HAS LACK OF TRANSPORTATION KEPT YOU FROM MEETINGS, WORK, OR FROM GETTING THINGS NEEDED FOR DAILY LIVING?: NO

## 2024-12-02 DIAGNOSIS — F90.0 ADHD (ATTENTION DEFICIT HYPERACTIVITY DISORDER), INATTENTIVE TYPE: ICD-10-CM

## 2024-12-02 RX ORDER — DEXTROAMPHETAMINE SACCHARATE, AMPHETAMINE ASPARTATE, DEXTROAMPHETAMINE SULFATE AND AMPHETAMINE SULFATE 3.75; 3.75; 3.75; 3.75 MG/1; MG/1; MG/1; MG/1
15 TABLET ORAL 2 TIMES DAILY
Qty: 60 TABLET | Refills: 0 | OUTPATIENT
Start: 2024-12-02 | End: 2025-01-01

## 2025-01-09 DIAGNOSIS — F90.0 ADHD (ATTENTION DEFICIT HYPERACTIVITY DISORDER), INATTENTIVE TYPE: ICD-10-CM

## 2025-01-09 RX ORDER — DEXTROAMPHETAMINE SACCHARATE, AMPHETAMINE ASPARTATE, DEXTROAMPHETAMINE SULFATE AND AMPHETAMINE SULFATE 3.75; 3.75; 3.75; 3.75 MG/1; MG/1; MG/1; MG/1
15 TABLET ORAL 2 TIMES DAILY
Qty: 60 TABLET | Refills: 0 | Status: SHIPPED | OUTPATIENT
Start: 2025-01-09 | End: 2025-02-08

## 2025-01-09 NOTE — TELEPHONE ENCOUNTER
Medication:   Requested Prescriptions     Pending Prescriptions Disp Refills    amphetamine-dextroamphetamine (ADDERALL, 15MG,) 15 MG tablet 60 tablet 0     Sig: Take 1 tablet by mouth 2 times daily for 30 days. Max Daily Amount: 30 mg        Last Filled:  11/18/24    Patient Phone Number: [phone] (home)     Last appt: 11/18/2024  Return in about 6 months (around 5/18/2025) for ADD/ADHD.  Next appt: Visit date not found    Last OARRS:       2/17/2022     3:26 PM   RX Monitoring   Periodic Controlled Substance Monitoring No signs of potential drug abuse or diversion identified.

## 2025-02-10 DIAGNOSIS — F90.0 ADHD (ATTENTION DEFICIT HYPERACTIVITY DISORDER), INATTENTIVE TYPE: ICD-10-CM

## 2025-02-10 RX ORDER — DEXTROAMPHETAMINE SACCHARATE, AMPHETAMINE ASPARTATE, DEXTROAMPHETAMINE SULFATE AND AMPHETAMINE SULFATE 3.75; 3.75; 3.75; 3.75 MG/1; MG/1; MG/1; MG/1
15 TABLET ORAL 2 TIMES DAILY
Qty: 60 TABLET | Refills: 0 | Status: SHIPPED | OUTPATIENT
Start: 2025-02-10 | End: 2025-03-12

## 2025-02-10 NOTE — TELEPHONE ENCOUNTER
Medication:   Requested Prescriptions     Pending Prescriptions Disp Refills    amphetamine-dextroamphetamine (ADDERALL, 15MG,) 15 MG tablet 60 tablet 0     Sig: Take 1 tablet by mouth 2 times daily for 30 days. Max Daily Amount: 30 mg        Last Filled:  01/09/25    Patient Phone Number: [phone] (home)     Last appt: 11/18/2024   Return in about 6 months (around 5/18/2025) for ADD/ADHD.  Next appt: Visit date not found    Last OARRS:       2/17/2022     3:26 PM   RX Monitoring   Periodic Controlled Substance Monitoring No signs of potential drug abuse or diversion identified.

## 2025-03-12 DIAGNOSIS — F90.0 ADHD (ATTENTION DEFICIT HYPERACTIVITY DISORDER), INATTENTIVE TYPE: ICD-10-CM

## 2025-03-12 RX ORDER — DEXTROAMPHETAMINE SACCHARATE, AMPHETAMINE ASPARTATE, DEXTROAMPHETAMINE SULFATE AND AMPHETAMINE SULFATE 3.75; 3.75; 3.75; 3.75 MG/1; MG/1; MG/1; MG/1
15 TABLET ORAL 2 TIMES DAILY
Qty: 60 TABLET | Refills: 0 | Status: SHIPPED | OUTPATIENT
Start: 2025-03-12 | End: 2025-04-11

## 2025-03-12 NOTE — TELEPHONE ENCOUNTER
Medication:   Requested Prescriptions     Pending Prescriptions Disp Refills    amphetamine-dextroamphetamine (ADDERALL, 15MG,) 15 MG tablet 60 tablet 0     Sig: Take 1 tablet by mouth 2 times daily for 30 days. Max Daily Amount: 30 mg        Last Filled:  2/10/25    Patient Phone Number: [phone] (home)     Last appt: 11/18/2024  Return in about 6 months (around 5/18/2025) for ADD/ADHD.  Next appt: Visit date not found    Last OARRS:       2/17/2022     3:26 PM   RX Monitoring   Periodic Controlled Substance Monitoring No signs of potential drug abuse or diversion identified.

## 2025-04-15 DIAGNOSIS — F90.0 ADHD (ATTENTION DEFICIT HYPERACTIVITY DISORDER), INATTENTIVE TYPE: ICD-10-CM

## 2025-04-16 RX ORDER — DEXTROAMPHETAMINE SACCHARATE, AMPHETAMINE ASPARTATE, DEXTROAMPHETAMINE SULFATE AND AMPHETAMINE SULFATE 3.75; 3.75; 3.75; 3.75 MG/1; MG/1; MG/1; MG/1
15 TABLET ORAL 2 TIMES DAILY
Qty: 60 TABLET | Refills: 0 | Status: SHIPPED | OUTPATIENT
Start: 2025-04-16 | End: 2025-05-16

## 2025-04-16 NOTE — TELEPHONE ENCOUNTER
Medication:   Requested Prescriptions     Pending Prescriptions Disp Refills    amphetamine-dextroamphetamine (ADDERALL, 15MG,) 15 MG tablet 60 tablet 0     Sig: Take 1 tablet by mouth 2 times daily for 30 days. Max Daily Amount: 30 mg        Last Filled:  3/12/25    Patient Phone Number: [phone] (home)     Last appt: 11/18/2024  Return in about 6 months (around 5/18/2025) for ADD/ADHD.  Next appt: Visit date not found    Last OARRS:       2/17/2022     3:26 PM   RX Monitoring   Periodic Controlled Substance Monitoring No signs of potential drug abuse or diversion identified.

## 2025-05-20 DIAGNOSIS — F90.0 ADHD (ATTENTION DEFICIT HYPERACTIVITY DISORDER), INATTENTIVE TYPE: ICD-10-CM

## 2025-05-21 RX ORDER — DEXTROAMPHETAMINE SACCHARATE, AMPHETAMINE ASPARTATE, DEXTROAMPHETAMINE SULFATE AND AMPHETAMINE SULFATE 3.75; 3.75; 3.75; 3.75 MG/1; MG/1; MG/1; MG/1
15 TABLET ORAL 2 TIMES DAILY
Qty: 60 TABLET | Refills: 0 | OUTPATIENT
Start: 2025-05-21 | End: 2025-06-20

## 2025-05-21 NOTE — TELEPHONE ENCOUNTER
Medication:   Requested Prescriptions     Pending Prescriptions Disp Refills    amphetamine-dextroamphetamine (ADDERALL, 15MG,) 15 MG tablet 60 tablet 0     Sig: Take 1 tablet by mouth 2 times daily for 30 days. Max Daily Amount: 30 mg        Last Filled:  04/16/25    Patient Phone Number: [phone] (home)     Last appt: 11/18/2024   Return in about 6 months (around 5/18/2025) for ADD/ADHD.  Next appt: Visit date not found    Last OARRS:       2/17/2022     3:26 PM   RX Monitoring   Periodic Controlled Substance Monitoring No signs of potential drug abuse or diversion identified.